# Patient Record
Sex: FEMALE | Race: WHITE | Employment: OTHER | URBAN - METROPOLITAN AREA
[De-identification: names, ages, dates, MRNs, and addresses within clinical notes are randomized per-mention and may not be internally consistent; named-entity substitution may affect disease eponyms.]

---

## 2019-06-17 ENCOUNTER — APPOINTMENT (OUTPATIENT)
Dept: GENERAL RADIOLOGY | Facility: CLINIC | Age: 59
End: 2019-06-17
Attending: PHYSICIAN ASSISTANT
Payer: COMMERCIAL

## 2019-06-17 ENCOUNTER — HOSPITAL ENCOUNTER (INPATIENT)
Facility: CLINIC | Age: 59
LOS: 5 days | Discharge: NURSING, REHAB OR LONG-TERM CARE | End: 2019-06-22
Attending: PHYSICIAN ASSISTANT | Admitting: INTERNAL MEDICINE
Payer: COMMERCIAL
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
## 2019-06-17 DIAGNOSIS — F41.9 ANXIETY: Primary | ICD-10-CM

## 2019-06-17 DIAGNOSIS — S72.001A CLOSED DISPLACED FRACTURE OF RIGHT FEMORAL NECK (H): ICD-10-CM

## 2019-06-17 LAB
ANION GAP SERPL CALCULATED.3IONS-SCNC: 4 MMOL/L (ref 3–14)
BASOPHILS # BLD AUTO: 0 10E9/L (ref 0–0.2)
BASOPHILS NFR BLD AUTO: 0.3 %
BUN SERPL-MCNC: 22 MG/DL (ref 7–30)
CALCIUM SERPL-MCNC: 8.8 MG/DL (ref 8.5–10.1)
CHLORIDE SERPL-SCNC: 109 MMOL/L (ref 94–109)
CO2 SERPL-SCNC: 29 MMOL/L (ref 20–32)
CREAT SERPL-MCNC: 0.97 MG/DL (ref 0.52–1.04)
DIFFERENTIAL METHOD BLD: ABNORMAL
EOSINOPHIL # BLD AUTO: 0.1 10E9/L (ref 0–0.7)
EOSINOPHIL NFR BLD AUTO: 0.5 %
ERYTHROCYTE [DISTWIDTH] IN BLOOD BY AUTOMATED COUNT: 12.3 % (ref 10–15)
GFR SERPL CREATININE-BSD FRML MDRD: 63 ML/MIN/{1.73_M2}
GLUCOSE SERPL-MCNC: 127 MG/DL (ref 70–99)
HCT VFR BLD AUTO: 40.4 % (ref 35–47)
HGB BLD-MCNC: 13.4 G/DL (ref 11.7–15.7)
IMM GRANULOCYTES # BLD: 0.1 10E9/L (ref 0–0.4)
IMM GRANULOCYTES NFR BLD: 0.6 %
INR PPP: 1.04 (ref 0.86–1.14)
LACTATE BLD-SCNC: 1.1 MMOL/L (ref 0.7–2)
LYMPHOCYTES # BLD AUTO: 1.1 10E9/L (ref 0.8–5.3)
LYMPHOCYTES NFR BLD AUTO: 8.3 %
MCH RBC QN AUTO: 29 PG (ref 26.5–33)
MCHC RBC AUTO-ENTMCNC: 33.2 G/DL (ref 31.5–36.5)
MCV RBC AUTO: 87 FL (ref 78–100)
MONOCYTES # BLD AUTO: 0.5 10E9/L (ref 0–1.3)
MONOCYTES NFR BLD AUTO: 3.5 %
NEUTROPHILS # BLD AUTO: 11.6 10E9/L (ref 1.6–8.3)
NEUTROPHILS NFR BLD AUTO: 86.8 %
NRBC # BLD AUTO: 0 10*3/UL
NRBC BLD AUTO-RTO: 0 /100
PLATELET # BLD AUTO: 240 10E9/L (ref 150–450)
POTASSIUM SERPL-SCNC: 4.6 MMOL/L (ref 3.4–5.3)
RBC # BLD AUTO: 4.62 10E12/L (ref 3.8–5.2)
SODIUM SERPL-SCNC: 142 MMOL/L (ref 133–144)
WBC # BLD AUTO: 13.3 10E9/L (ref 4–11)

## 2019-06-17 PROCEDURE — 85610 PROTHROMBIN TIME: CPT | Performed by: PHYSICIAN ASSISTANT

## 2019-06-17 PROCEDURE — 25000132 ZZH RX MED GY IP 250 OP 250 PS 637: Performed by: INTERNAL MEDICINE

## 2019-06-17 PROCEDURE — 99223 1ST HOSP IP/OBS HIGH 75: CPT | Mod: AI | Performed by: INTERNAL MEDICINE

## 2019-06-17 PROCEDURE — 99285 EMERGENCY DEPT VISIT HI MDM: CPT | Mod: 25

## 2019-06-17 PROCEDURE — 36415 COLL VENOUS BLD VENIPUNCTURE: CPT | Performed by: PHYSICIAN ASSISTANT

## 2019-06-17 PROCEDURE — 80048 BASIC METABOLIC PNL TOTAL CA: CPT | Performed by: PHYSICIAN ASSISTANT

## 2019-06-17 PROCEDURE — 85025 COMPLETE CBC W/AUTO DIFF WBC: CPT | Performed by: PHYSICIAN ASSISTANT

## 2019-06-17 PROCEDURE — 36415 COLL VENOUS BLD VENIPUNCTURE: CPT | Performed by: INTERNAL MEDICINE

## 2019-06-17 PROCEDURE — 93005 ELECTROCARDIOGRAM TRACING: CPT

## 2019-06-17 PROCEDURE — 25800030 ZZH RX IP 258 OP 636: Performed by: INTERNAL MEDICINE

## 2019-06-17 PROCEDURE — 73502 X-RAY EXAM HIP UNI 2-3 VIEWS: CPT

## 2019-06-17 PROCEDURE — 85610 PROTHROMBIN TIME: CPT | Performed by: INTERNAL MEDICINE

## 2019-06-17 PROCEDURE — 96374 THER/PROPH/DIAG INJ IV PUSH: CPT

## 2019-06-17 PROCEDURE — 96376 TX/PRO/DX INJ SAME DRUG ADON: CPT

## 2019-06-17 PROCEDURE — 12000000 ZZH R&B MED SURG/OB

## 2019-06-17 PROCEDURE — 25000128 H RX IP 250 OP 636: Performed by: INTERNAL MEDICINE

## 2019-06-17 PROCEDURE — 83605 ASSAY OF LACTIC ACID: CPT | Performed by: INTERNAL MEDICINE

## 2019-06-17 PROCEDURE — 25000128 H RX IP 250 OP 636: Performed by: PHYSICIAN ASSISTANT

## 2019-06-17 RX ORDER — ONDANSETRON 2 MG/ML
4 INJECTION INTRAMUSCULAR; INTRAVENOUS ONCE
Status: DISCONTINUED | OUTPATIENT
Start: 2019-06-17 | End: 2019-06-18

## 2019-06-17 RX ORDER — GUANFACINE 2 MG/1
2 TABLET ORAL AT BEDTIME
COMMUNITY
Start: 2019-05-31 | End: 2019-07-11

## 2019-06-17 RX ORDER — TRAZODONE HYDROCHLORIDE 50 MG/1
100 TABLET, FILM COATED ORAL AT BEDTIME
Status: DISCONTINUED | OUTPATIENT
Start: 2019-06-17 | End: 2019-06-22 | Stop reason: HOSPADM

## 2019-06-17 RX ORDER — HYDROMORPHONE HYDROCHLORIDE 1 MG/ML
.3-.5 INJECTION, SOLUTION INTRAMUSCULAR; INTRAVENOUS; SUBCUTANEOUS
Status: DISCONTINUED | OUTPATIENT
Start: 2019-06-17 | End: 2019-06-18

## 2019-06-17 RX ORDER — NAPROXEN SODIUM 550 MG/1
550 TABLET ORAL
COMMUNITY
Start: 2019-02-12

## 2019-06-17 RX ORDER — GABAPENTIN 300 MG/1
900 CAPSULE ORAL 3 TIMES DAILY
COMMUNITY
Start: 2019-02-12

## 2019-06-17 RX ORDER — BUPROPION HYDROCHLORIDE 300 MG/1
300 TABLET ORAL EVERY MORNING
COMMUNITY
Start: 2019-02-12 | End: 2019-06-26

## 2019-06-17 RX ORDER — NALOXONE HYDROCHLORIDE 0.4 MG/ML
.1-.4 INJECTION, SOLUTION INTRAMUSCULAR; INTRAVENOUS; SUBCUTANEOUS
Status: DISCONTINUED | OUTPATIENT
Start: 2019-06-17 | End: 2019-06-18

## 2019-06-17 RX ORDER — POLYETHYLENE GLYCOL 3350 17 G/17G
17 POWDER, FOR SOLUTION ORAL DAILY PRN
Status: DISCONTINUED | OUTPATIENT
Start: 2019-06-17 | End: 2019-06-22 | Stop reason: HOSPADM

## 2019-06-17 RX ORDER — ONDANSETRON 2 MG/ML
4 INJECTION INTRAMUSCULAR; INTRAVENOUS ONCE
Status: DISCONTINUED | OUTPATIENT
Start: 2019-06-17 | End: 2019-06-17

## 2019-06-17 RX ORDER — AMOXICILLIN 250 MG
1-2 CAPSULE ORAL 2 TIMES DAILY PRN
Status: DISCONTINUED | OUTPATIENT
Start: 2019-06-17 | End: 2019-06-22 | Stop reason: HOSPADM

## 2019-06-17 RX ORDER — LORAZEPAM 1 MG/1
1 TABLET ORAL DAILY PRN
Status: ON HOLD | COMMUNITY
Start: 2019-05-31 | End: 2019-06-22

## 2019-06-17 RX ORDER — LORAZEPAM 1 MG/1
1 TABLET ORAL DAILY PRN
Status: DISCONTINUED | OUTPATIENT
Start: 2019-06-17 | End: 2019-06-18

## 2019-06-17 RX ORDER — DULOXETIN HYDROCHLORIDE 60 MG/1
60 CAPSULE, DELAYED RELEASE ORAL DAILY
COMMUNITY
Start: 2019-05-31 | End: 2020-05-30

## 2019-06-17 RX ORDER — ACETAMINOPHEN 325 MG/1
650 TABLET ORAL EVERY 4 HOURS PRN
Status: DISCONTINUED | OUTPATIENT
Start: 2019-06-17 | End: 2019-06-18

## 2019-06-17 RX ORDER — GUANFACINE 1 MG/1
2 TABLET ORAL AT BEDTIME
Status: DISCONTINUED | OUTPATIENT
Start: 2019-06-17 | End: 2019-06-22 | Stop reason: HOSPADM

## 2019-06-17 RX ORDER — HYDROMORPHONE HYDROCHLORIDE 1 MG/ML
0.5 INJECTION, SOLUTION INTRAMUSCULAR; INTRAVENOUS; SUBCUTANEOUS
Status: COMPLETED | OUTPATIENT
Start: 2019-06-17 | End: 2019-06-17

## 2019-06-17 RX ORDER — HYDROMORPHONE HYDROCHLORIDE 1 MG/ML
0.5 INJECTION, SOLUTION INTRAMUSCULAR; INTRAVENOUS; SUBCUTANEOUS ONCE
Status: COMPLETED | OUTPATIENT
Start: 2019-06-17 | End: 2019-06-17

## 2019-06-17 RX ORDER — HYDROMORPHONE HYDROCHLORIDE 1 MG/ML
.3-.5 INJECTION, SOLUTION INTRAMUSCULAR; INTRAVENOUS; SUBCUTANEOUS
Status: DISCONTINUED | OUTPATIENT
Start: 2019-06-17 | End: 2019-06-17

## 2019-06-17 RX ORDER — DULOXETIN HYDROCHLORIDE 20 MG/1
20 CAPSULE, DELAYED RELEASE ORAL
COMMUNITY
Start: 2019-05-31 | End: 2019-06-17

## 2019-06-17 RX ORDER — BUPROPION HYDROCHLORIDE 150 MG/1
300 TABLET ORAL EVERY MORNING
COMMUNITY
Start: 2019-05-31 | End: 2020-05-30

## 2019-06-17 RX ORDER — OXYCODONE HYDROCHLORIDE 5 MG/1
5-10 TABLET ORAL EVERY 4 HOURS PRN
Status: DISCONTINUED | OUTPATIENT
Start: 2019-06-17 | End: 2019-06-18

## 2019-06-17 RX ORDER — SODIUM CHLORIDE 9 MG/ML
INJECTION, SOLUTION INTRAVENOUS CONTINUOUS
Status: DISCONTINUED | OUTPATIENT
Start: 2019-06-17 | End: 2019-06-18

## 2019-06-17 RX ORDER — TRAZODONE HYDROCHLORIDE 50 MG/1
100 TABLET, FILM COATED ORAL AT BEDTIME
COMMUNITY
Start: 2019-02-12

## 2019-06-17 RX ORDER — BUPROPION HYDROCHLORIDE 150 MG/1
450 TABLET ORAL DAILY
Status: DISCONTINUED | OUTPATIENT
Start: 2019-06-18 | End: 2019-06-22 | Stop reason: HOSPADM

## 2019-06-17 RX ORDER — GABAPENTIN 300 MG/1
300 CAPSULE ORAL 3 TIMES DAILY
Status: DISCONTINUED | OUTPATIENT
Start: 2019-06-17 | End: 2019-06-22 | Stop reason: HOSPADM

## 2019-06-17 RX ADMIN — HYDROMORPHONE HYDROCHLORIDE 0.5 MG: 1 INJECTION, SOLUTION INTRAMUSCULAR; INTRAVENOUS; SUBCUTANEOUS at 21:49

## 2019-06-17 RX ADMIN — GUANFACINE 2 MG: 1 TABLET ORAL at 21:15

## 2019-06-17 RX ADMIN — TRAZODONE HYDROCHLORIDE 100 MG: 50 TABLET ORAL at 21:16

## 2019-06-17 RX ADMIN — SODIUM CHLORIDE: 9 INJECTION, SOLUTION INTRAVENOUS at 22:06

## 2019-06-17 RX ADMIN — HYDROMORPHONE HYDROCHLORIDE 0.5 MG: 1 INJECTION, SOLUTION INTRAMUSCULAR; INTRAVENOUS; SUBCUTANEOUS at 16:15

## 2019-06-17 RX ADMIN — OXYCODONE HYDROCHLORIDE 10 MG: 5 TABLET ORAL at 19:25

## 2019-06-17 RX ADMIN — HYDROMORPHONE HYDROCHLORIDE 0.5 MG: 1 INJECTION, SOLUTION INTRAMUSCULAR; INTRAVENOUS; SUBCUTANEOUS at 18:10

## 2019-06-17 RX ADMIN — HYDROMORPHONE HYDROCHLORIDE 0.5 MG: 1 INJECTION, SOLUTION INTRAMUSCULAR; INTRAVENOUS; SUBCUTANEOUS at 15:30

## 2019-06-17 RX ADMIN — HYDROMORPHONE HYDROCHLORIDE 0.5 MG: 1 INJECTION, SOLUTION INTRAMUSCULAR; INTRAVENOUS; SUBCUTANEOUS at 20:09

## 2019-06-17 RX ADMIN — GABAPENTIN 300 MG: 300 CAPSULE ORAL at 19:26

## 2019-06-17 SDOH — HEALTH STABILITY: MENTAL HEALTH: HOW OFTEN DO YOU HAVE A DRINK CONTAINING ALCOHOL?: NEVER

## 2019-06-17 ASSESSMENT — ACTIVITIES OF DAILY LIVING (ADL)
ADLS_ACUITY_SCORE: 18
BATHING: 0-->INDEPENDENT
DRESS: 0-->INDEPENDENT
RETIRED_EATING: 0-->INDEPENDENT
AMBULATION: 0-->INDEPENDENT
SWALLOWING: 0-->SWALLOWS FOODS/LIQUIDS WITHOUT DIFFICULTY
TOILETING: 0-->INDEPENDENT
RETIRED_COMMUNICATION: 0-->UNDERSTANDS/COMMUNICATES WITHOUT DIFFICULTY
WHICH_OF_THE_ABOVE_FUNCTIONAL_RISKS_HAD_A_RECENT_ONSET_OR_CHANGE?: AMBULATION
FALL_HISTORY_WITHIN_LAST_SIX_MONTHS: YES
COGNITION: 0 - NO COGNITION ISSUES REPORTED
NUMBER_OF_TIMES_PATIENT_HAS_FALLEN_WITHIN_LAST_SIX_MONTHS: 1
TRANSFERRING: 0-->INDEPENDENT

## 2019-06-17 ASSESSMENT — MIFFLIN-ST. JEOR: SCORE: 1337.46

## 2019-06-17 ASSESSMENT — ENCOUNTER SYMPTOMS
NUMBNESS: 0
ARTHRALGIAS: 1

## 2019-06-17 NOTE — H&P
[facility]  Hospitalist Admission Note  Name: Greyson Rogers    MRN: [id number]  YOB: 1960    Age: 59 year old  Date of admission: 6/17/2019  Primary care provider: Shakira Edwards    Chief Complaint: Hip pain    Assessment and Plan:   Mechanical fall, right femoral neck fracture: Patient tripped while walking her dog and landed on her right hip.  Unable to ambulate secondary to pain.  No other areas of pain.  Pelvis x-ray showing a impacted right femoral neck fracture.  Anticipate operative repair needed.  RCRI score of 0.  No recent chest pain or shortness of breath with exertion.  Normal-appearing EKG.  Medically optimized for repair of hip fracture.  -Orthopedics consult  -N.p.o. at midnight  -Acetaminophen, oral oxycodone, IV Dilaudid as needed for pain control  -PT, OT consult  -PCD's for now    MDD, anxiety: Continue PTA Wellbutrin  mg daily, Cymbalta 80 mg daily, guanfacine 2 mg daily, trazodone 100 mg at bedtime, and lorazepam 1 mg daily as needed.    HLD, hypertriglyceridemia: Not currently on medications for this.    Chronic low back pain: Degenerative disease on previous x-rays.  Continue PTA gabapentin 300 mg 3 times daily.    Soft blood pressure: Pressure 102/52.  Suspect a chronic issue.  Vital signs at last care everywhere visit showed a blood pressure of 90/56.    DVT Prophylaxis: Pneumatic Compression Devices  Code Status: Full Code  FEN: Regular diet until n.p.o. midnight, start IV fluids when n.p.o.  Discharge Dispo: TBD.  PT, OT, social work consult  Estimated Disch Date / # of Days until Disch: Admit to inpatient for acute hip fracture.  Anticipate minimal 2 night hospitalization.      History of Present Illness:  Greyson Rogers is a 59 year old female with PMH including MDD, anxiety, HLD, and chronic low back pain who presents with right hip pain following a fall.  The patient was walking her dog when the dog pulled on the leash hard and she turned and fell  onto her right hip.  Instantly had severe pain on the right hip.  She attempted to stand but could not secondary to pain.  EMS was called.  She denies any lightheadedness or dizziness prior to falling.  No history of syncope.  No other areas of pain currently.  Denies any recent chest pain or shortness of breath with exertion.  She is a non-smoker.  No previous surgical history.  Prior to this was in normal state health without any recent fevers, chills, cough, abdominal pain, nausea, vomiting, dysuria, diarrhea.    History obtained from patient, medical record, and from TAMAR Green in the emergency department.  Blood pressure 102/52.  Pulse 67.  Oxygen 93% room air.  Afebrile.  X-ray of the pelvis showing an impacted right femoral neck fracture.  Received 0.5 mg IV Dilaudid.  Anticipate will need operative repair.  Basic labs and EKG being obtained.  Admit inpatient.     Past Medical History reviewed:  MDD  Anxiety  HLD  Chronic back pain    Past Surgical History reviewed:  Breast biopsy    Social History reviewed:  Social History     Tobacco Use     Smoking status: Never Smoker   Substance Use Topics     Alcohol use: Never     Frequency: Never     Social History     Social History Narrative     Not on file     Family History reviewed:  Mother with history of breast cancer and osteoporosis  Sister with history of mitral valve prolapse  Denies any family history of problems with anesthetic or DVT/PE    Allergies:  No Known Allergies  Medications:  Prior to Admission medications    Medication Sig Last Dose Taking? Auth Provider   buPROPion (WELLBUTRIN XL) 150 MG 24 hr tablet Take 150 mg by mouth daily  Yes Reported, Patient   buPROPion (WELLBUTRIN XL) 300 MG 24 hr tablet Take 300 mg by mouth daily  Yes Reported, Patient   DULoxetine (CYMBALTA) 20 MG capsule Take 20 mg by mouth daily  Yes Reported, Patient   DULoxetine (CYMBALTA) 60 MG capsule Take 60 mg by mouth daily  Yes Reported, Patient   gabapentin (NEURONTIN)  "300 MG capsule Take 300 mg by mouth tid  Yes Reported, Patient   guanFACINE (TENEX) 2 MG tablet Take 2 mg by mouth daily  Yes Reported, Patient   LORazepam (ATIVAN) 1 MG tablet Take 1 mg by mouth daily prn  Yes Reported, Patient   naproxen sodium (ANAPROX) 550 MG tablet Take 550 mg by mouth  Yes Reported, Patient   traZODone (DESYREL) 50 MG tablet Take 100 mg by mouth hs  Yes Reported, Patient     Review of Systems:  A Comprehensive greater than 10 system review of systems was carried out.  Pertinent positives and negatives are noted above.  Otherwise negative.     Physical Exam:  Blood pressure 102/52, pulse 67, temperature 98.6  F (37  C), temperature source Oral, resp. rate 22, height 1.753 m (5' 9\"), weight 70.3 kg (155 lb), SpO2 94 %.  Wt Readings from Last 1 Encounters:   06/17/19 70.3 kg (155 lb)     Exam:  Constitutional: Awake, NAD  Eyes: sclera white, small pupils bilaterally  HEENT: atraumatic, MMM  Respiratory: no respiratory distress, lungs cta bilaterally, no crackles or wheeze  Cardiovascular: RRR.  No murmur   GI: non-tender, not distended, bowel sounds present  Skin: no rash or lesions, acyanotic  Musculoskeletal/extremities: No lower extremity edema  Neurologic: A&O, speech clear, light touch sensation intact, wiggles toes laterally  Psychiatric: calm, cooperative, normal affect    Lab and imaging data personally reviewed:  Labs:  Recent Labs   Lab 06/17/19  1648   WBC 13.3*   HGB 13.4   HCT 40.4   MCV 87        Recent Labs   Lab 06/17/19  1648      POTASSIUM 4.6   CHLORIDE 109   CO2 29   ANIONGAP 4   *   BUN 22   CR 0.97   GFRESTIMATED 63   GFRESTBLACK 73   ESTHER 8.8       EKG: NSR without ST elevation or depression, no Q waves    Imaging:  Recent Results (from the past 24 hour(s))   XR Pelvis w Hip Right 1 View    Narrative    PELVIS AND HIP RIGHT ONE VIEW   6/17/2019 3:59 PM     HISTORY: Right hip pain after fall.    COMPARISON: None.      Impression    IMPRESSION: Acute right " femoral neck fracture that appears impacted.  Posterior angulation of the distal fracture fragment. Right femoral  head appears to be located. Left hip is unremarkable.    MD Julio Cesar JONES MD  Hospitalist  [facility]

## 2019-06-17 NOTE — ED PROVIDER NOTES
Emergency Department Attending Supervision Note  6/17/2019  4:17 PM      I evaluated this patient in conjunction with Eneida Shannon PA-C.      Briefly, the patient presented with right hip pain after mechanical fall onto the site. Exam is consistent with likely fracture, which is confirmed on XR (below). Plan ortho consult and admission to the hospitalist service for risk stratification.    Results for orders placed or performed during the hospital encounter of 06/17/19 (from the past 24 hour(s))   XR Pelvis w Hip Right 1 View    Narrative    PELVIS AND HIP RIGHT ONE VIEW   6/17/2019 3:59 PM     HISTORY: Right hip pain after fall.    COMPARISON: None.      Impression    IMPRESSION: Acute right femoral neck fracture that appears impacted.  Posterior angulation of the distal fracture fragment. Right femoral  head appears to be located. Left hip is unremarkable.    JAROCHO COLLIER MD   EKG 12 lead   Result Value Ref Range    Interpretation ECG Click View Image link to view waveform and result    Basic metabolic panel (BMP)   Result Value Ref Range    Sodium 142 133 - 144 mmol/L    Potassium 4.6 3.4 - 5.3 mmol/L    Chloride 109 94 - 109 mmol/L    Carbon Dioxide 29 20 - 32 mmol/L    Anion Gap 4 3 - 14 mmol/L    Glucose 127 (H) 70 - 99 mg/dL    Urea Nitrogen 22 7 - 30 mg/dL    Creatinine 0.97 0.52 - 1.04 mg/dL    GFR Estimate 63 >60 mL/min/[1.73_m2]    GFR Estimate If Black 73 >60 mL/min/[1.73_m2]    Calcium 8.8 8.5 - 10.1 mg/dL   CBC + differential   Result Value Ref Range    WBC 13.3 (H) 4.0 - 11.0 10e9/L    RBC Count 4.62 3.8 - 5.2 10e12/L    Hemoglobin 13.4 11.7 - 15.7 g/dL    Hematocrit 40.4 35.0 - 47.0 %    MCV 87 78 - 100 fl    MCH 29.0 26.5 - 33.0 pg    MCHC 33.2 31.5 - 36.5 g/dL    RDW 12.3 10.0 - 15.0 %    Platelet Count 240 150 - 450 10e9/L    Diff Method Automated Method     % Neutrophils 86.8 %    % Lymphocytes 8.3 %    % Monocytes 3.5 %    % Eosinophils 0.5 %    % Basophils 0.3 %    % Immature Granulocytes  0.6 %    Nucleated RBCs 0 0 /100    Absolute Neutrophil 11.6 (H) 1.6 - 8.3 10e9/L    Absolute Lymphocytes 1.1 0.8 - 5.3 10e9/L    Absolute Monocytes 0.5 0.0 - 1.3 10e9/L    Absolute Eosinophils 0.1 0.0 - 0.7 10e9/L    Absolute Basophils 0.0 0.0 - 0.2 10e9/L    Abs Immature Granulocytes 0.1 0 - 0.4 10e9/L    Absolute Nucleated RBC 0.0        My impression is:    ICD-10-CM   1. Closed displaced fracture of right femoral neck (H) S72.001A           2. Fall, initial encounter W19.XXXA              MD Kvng Parada John Eric, MD  06/17/19 1754

## 2019-06-17 NOTE — PHARMACY-ADMISSION MEDICATION HISTORY
Admission medication history interview status for this patient is complete. See [facility] admission navigator for allergy information, prior to admission medications and immunization status.     Medication history interview source(s):Patient  Medication history resources (including written lists, pill bottles, clinic record):EPIC    Changes made to PTA medication list:  Added: frequencies to some meds  Deleted: Duloxetine 20mg, per pt is using only 60mg tabs  Changed: as above    Medication reconciliation/reorder completed by provider prior to medication history? No    Do you take OTC medications (eg tylenol, ibuprofen, fish oil, eye/ear drops, etc)? Y(Y/N)    For patients on insulin therapy: N (Y/N)  Lantus/levemir/NPH/Mix 70/30 dose:   (Y/N) (see Med list for doses)   Sliding scale Novolog Y/N  If Yes, do you have a baseline novolog pre-meal dose:  units with meals  Patients eat three meals a day:   Y/N    How many episodes of hypoglycemia do you have per week: _______  How many missed doses do you have per week: ______  How many times do you check your blood glucose per day: _______  Do you have a Continuous glucose monitor (CGM)   Y/N (remind pt that not approved for hospital use)   Any Barriers to therapy - Be specific :  cost of medications, comfortable with giving injections (if applicable), comfortable and confident with current diabetes regimen: Y/N ______________      Prior to Admission medications    Medication Sig Last Dose Taking? Auth Provider   buPROPion (WELLBUTRIN XL) 150 MG 24 hr tablet Take 150 mg by mouth every morning  6/17/2019 at Unknown time Yes Reported, Patient   buPROPion (WELLBUTRIN XL) 300 MG 24 hr tablet Take 300 mg by mouth every morning  6/17/2019 at Unknown time Yes Reported, Patient   DULoxetine (CYMBALTA) 60 MG capsule Take 60 mg by mouth daily  6/17/2019 at Unknown time Yes Reported, Patient   gabapentin (NEURONTIN) 300 MG capsule Take 900 mg by mouth 3 times daily  6/17/2019 at am Yes  Reported, Patient   guanFACINE (TENEX) 2 MG tablet Take 2 mg by mouth At Bedtime  6/16/2019 at Unknown time Yes Reported, Patient   LORazepam (ATIVAN) 1 MG tablet Take 1 mg by mouth daily as needed for anxiety   Yes Reported, Patient   naproxen sodium (ANAPROX) 550 MG tablet Take 550 mg by mouth daily (with breakfast)  6/17/2019 at Unknown time Yes Reported, Patient   traZODone (DESYREL) 50 MG tablet Take 100 mg by mouth At Bedtime  6/16/2019 at Unknown time Yes Reported, Patient

## 2019-06-17 NOTE — ED NOTES
Bed: ED30  Expected date: 6/17/19  Expected time: 2:56 PM  Means of arrival: Ambulance  Comments:  66 yo F

## 2019-06-17 NOTE — ED NOTES
"[facility]  ED Nurse Handoff Report    Greyson Rogers is a 59 year old female   ED Chief complaint: Hip Pain  . ED Diagnosis:   Final diagnoses:   Closed displaced fracture of right femoral neck (H)   Fall, initial encounter     Allergies: No Known Allergies    Code Status: Full Code  Activity level - Baseline/Home:  independent. Activity Level - Current:   Total Care. Lift room needed: Yes. Bariatric: No   Needed: No   Isolation: No. Infection: Not Applicable.     Vital Signs:   Vitals:    06/17/19 1509 06/17/19 1530 06/17/19 1615   BP: (!) 125/111 102/52    Pulse: 81 67    Resp: 22     Temp: 98.6  F (37  C)     TempSrc: Oral     SpO2: 98% 93% 94%   Weight: 70.3 kg (155 lb)     Height: 1.753 m (5' 9\")         Cardiac Rhythm:  ,      Pain level: 0-10 Pain Scale: 10  Patient confused: No. Patient Falls Risk: Yes.   Elimination Status: Pt has not voided in ED  Patient Report - Initial Complaint: hip pain. Focused Assessment: Pt states having right sided hip pain after getting pulled by her dog and landing on right hip. Pt was unable to get off the ground after incident. CMS intact. Pt initially was unable to extend leg and was laying in a position of comfort with leg bent. After return from xray pt was able to extend leg. Pt in on 2 liters NC after desatting to 88% after pain meds.  Tests Performed: xray. Abnormal Results: Abnormal Labs Reviewed - No abnormal labs to display.   Treatments provided: dilaudid 0.5 x 2  Family Comments: Daughter at bedside and aware of plan of care  OBS brochure/video discussed/provided to patient:  N/A  ED Medications:   Medications   HYDROmorphone (PF) (DILAUDID) injection 0.5 mg (0.5 mg Intravenous Given 6/17/19 1615)     Drips infusing:  No  For the majority of the shift, the patient's behavior Green. Interventions performed were n/a.     Severe Sepsis OR Septic Shock Diagnosis Present: No      ED Nurse Name/Phone Number: Vanessa Jaime,   4:24 PM  RECEIVING " UNIT ED HANDOFF REVIEW    Above ED Nurse Handoff Report was reviewed: Yes  Reviewed by: Leanna Canchola on June 17, 2019 at 5:57 PM

## 2019-06-17 NOTE — ED PROVIDER NOTES
"  History     Chief Complaint:  Right Hip Pain    HPI   Greyson Rogers is a 59 year old female with a history of depression who presents via EMS with right hip pain. The patient reports that she got pulled by her dog and stumbled and fell on her right hip. She reports being unable to stand or straighten her leg after the accident. Police were in the neighborhood and told her to go inside after the incident. Upon EMS arrival they administered 100 mcg of Fentanyl with some relief. Upon arrival to the ED, the patient reports her pain is again a 10/10 in severity. She denies any numbness or tingling in her leg.     Allergies:  NKDA     Medications:    Tylenol   Wellbutrin   Neurontin  Anaprox  Desyrel   Cymbalta  Tenex  Ativan    Past Medical History:    Depression  Chronic insomnia  Chronic back pain  Hyperlipidemia  IFG  MARTA    Past Surgical History:    Left Breast Biopsy    Family History:    Depression  Breast cancer  Osteoporosis  Heart Disease    Social History:  Negative for tobacco use.  Negative for alcohol use.  Marital Status:   [2]       Review of Systems   Musculoskeletal: Positive for arthralgias (Right hip).   Neurological: Negative for numbness.   All other systems reviewed and are negative.      Physical Exam     Patient Vitals for the past 24 hrs:   BP Temp Temp src Pulse Heart Rate Resp SpO2 Height Weight   06/17/19 1615 -- -- -- -- -- -- 94 % -- --   06/17/19 1530 102/52 -- -- 67 -- -- 93 % -- --   06/17/19 1509 (!) 125/111 98.6  F (37  C) Oral 81 81 22 98 % 1.753 m (5' 9\") 70.3 kg (155 lb)       Physical Exam   General: Alert, interactive. Significant pain with movement of the right hip.   Head:  Scalp is atraumatic.  Eyes:  EOM intact. The pupils are equal, round, and reactive to light. No scleral icterus.  ENT:                                      Ears:  The external ears are normal.  Nose:  The external nose is normal.  Throat:  The oropharynx is normal. Mucus membranes are dry.             "     Neck:  Normal range of motion. There is no rigidity.   CV:  Regular rate and rhythm. No murmur. 2+ radial pulses  Resp:  Breath sounds are clear bilaterally. Non-labored, no retractions or accessory muscle use.  GI:  Abdomen is soft, no distension, no tenderness.   MS:  Diffuse tenderness to the right hip. Hip flexed and patient elicits significant pain with extension. Remainder of right lower extremity non tender.   Skin:  Warm and dry. No open wound.  Neuro:  Strength and sensation grossly intact. 5/5 strength with DF and PF. Sensation intact to RLE.   Psych:  Awake. Alert.  Appropriate interactions.     Emergency Department Course   Imaging:  Radiographic findings were communicated with the patient who voiced understanding of the findings.    XR Hip Right 2-3 views:   Acute right femoral neck fracture that appears impacted.  Posterior angulation of the distal fracture fragment. Right femoral  head appears to be located. Left hip is unremarkable. As per radiology.     Laboratory:   CBC: WBC: 13.3 (H), HGB: 13.4, PLT: 240  BMP: Glucose 127 (H), o/w WNL (Creatinine: 0.97)    Interventions:  1530 Dilaudid 0.5 mg IV  1615 Dilaudid 0.5 mg IV    Emergency Department Course:  Nursing notes and vitals reviewed. (1514) I performed an exam of the patient as documented above.      IV inserted. Medicine administered as documented above. Blood drawn. This was sent to the lab for further testing, results above.     The patient was sent for a XR Right Hip while in the emergency department, findings above.      (1610) I rechecked the patient and discussed the results of her workup thus far.      (1640) I consulted with Dr. Bell of the hospitalist services. They are in agreement to accept the patient for admission    (1641) I consulted with Namrata Hurley PA-C, Orthopedics, regarding the patient's history and presentation here in the emergency department.    (1653) I rechecked the patient and discussed plans for  admission.    Findings and plan explained to the Patient who consents to admission. Discussed the patient with Dr. Bell, who will admit the patient to a bed for further monitoring, evaluation, and treatment.     I personally reviewed the imaging results with the Patient and answered all related questions prior to admission    Impression & Plan    Medical Decision Making:  Greyson Rogers is a 59 year old female with medical history including depression and chronic back pain who presents for evaluation of right hip pain pain after getting pulled by her dog and falling. CMS is intact distally in the extremity.  Xrays reveal a right femoral neck fracture that appears impacted;.  The patients head to toe trauma exam is otherwise normal at this time and no further trauma workup is needed as I believe there is no signs of serious head, neck, chest, spinal, extremity or abdominal injuries.  I discussed patient with orthopedics who plan to consult tomorrow morning and likely surgery.  Discussed patient with Dr. Bell of the hospitalist service who graciously accepted care of the patient.  Patient's pain was controlled with interventions in the emergency department and she agrees with plan for admission.     Diagnosis:    ICD-10-CM    1. Closed displaced fracture of right femoral neck (H) S72.001A Basic metabolic panel (BMP)     CBC + differential   2. Fall, initial encounter W19.XXXA      Disposition:  Admitted to hospital    Scribe Disclosure:  I, Charity Sweet, am serving as a scribe on 6/17/2019 at 3:24 PM to personally document services performed by Eneida Shannon PA-C based on my observations and the provider's statements to me.     Charity Sweet  6/17/2019   [facility] EMERGENCY DEPARTMENT       Eneida Shannon PA-C  06/17/19 1746

## 2019-06-17 NOTE — ED TRIAGE NOTES
Pt arrives to the ED via EMS from home where she got pulled by her dog and landed on her right hip. Pt unable to stand after incident. Pt given 100 mcg IV of fentanyl by EMS with some relief. CMS intact. Upon arrival pt unable to extend leg due to pain.

## 2019-06-18 ENCOUNTER — ANESTHESIA (OUTPATIENT)
Dept: SURGERY | Facility: CLINIC | Age: 59
End: 2019-06-18
Payer: COMMERCIAL

## 2019-06-18 ENCOUNTER — APPOINTMENT (OUTPATIENT)
Dept: GENERAL RADIOLOGY | Facility: CLINIC | Age: 59
End: 2019-06-18
Attending: ORTHOPAEDIC SURGERY
Payer: COMMERCIAL

## 2019-06-18 ENCOUNTER — ANESTHESIA EVENT (OUTPATIENT)
Dept: SURGERY | Facility: CLINIC | Age: 59
End: 2019-06-18
Payer: COMMERCIAL

## 2019-06-18 PROBLEM — S72.009A FEMORAL NECK FRACTURE (H): Status: ACTIVE | Noted: 2019-06-18

## 2019-06-18 LAB
ABO + RH BLD: NORMAL
ABO + RH BLD: NORMAL
BLD GP AB SCN SERPL QL: NORMAL
BLOOD BANK CMNT PATIENT-IMP: NORMAL
INR PPP: 1.08 (ref 0.86–1.14)
INTERPRETATION ECG - MUSE: NORMAL
SPECIMEN EXP DATE BLD: NORMAL

## 2019-06-18 PROCEDURE — 25000128 H RX IP 250 OP 636: Performed by: ANESTHESIOLOGY

## 2019-06-18 PROCEDURE — 37000009 ZZH ANESTHESIA TECHNICAL FEE, EACH ADDTL 15 MIN: Performed by: ORTHOPAEDIC SURGERY

## 2019-06-18 PROCEDURE — 25000128 H RX IP 250 OP 636: Performed by: NURSE ANESTHETIST, CERTIFIED REGISTERED

## 2019-06-18 PROCEDURE — 37000008 ZZH ANESTHESIA TECHNICAL FEE, 1ST 30 MIN: Performed by: ORTHOPAEDIC SURGERY

## 2019-06-18 PROCEDURE — 71000012 ZZH RECOVERY PHASE 1 LEVEL 1 FIRST HR: Performed by: ORTHOPAEDIC SURGERY

## 2019-06-18 PROCEDURE — 0SR903A REPLACEMENT OF RIGHT HIP JOINT WITH CERAMIC SYNTHETIC SUBSTITUTE, UNCEMENTED, OPEN APPROACH: ICD-10-PCS | Performed by: ORTHOPAEDIC SURGERY

## 2019-06-18 PROCEDURE — 25000128 H RX IP 250 OP 636: Performed by: ORTHOPAEDIC SURGERY

## 2019-06-18 PROCEDURE — 27210794 ZZH OR GENERAL SUPPLY STERILE: Performed by: ORTHOPAEDIC SURGERY

## 2019-06-18 PROCEDURE — 99232 SBSQ HOSP IP/OBS MODERATE 35: CPT | Performed by: INTERNAL MEDICINE

## 2019-06-18 PROCEDURE — 86900 BLOOD TYPING SEROLOGIC ABO: CPT | Performed by: PHYSICIAN ASSISTANT

## 2019-06-18 PROCEDURE — 12000000 ZZH R&B MED SURG/OB

## 2019-06-18 PROCEDURE — 86850 RBC ANTIBODY SCREEN: CPT | Performed by: PHYSICIAN ASSISTANT

## 2019-06-18 PROCEDURE — 25800030 ZZH RX IP 258 OP 636: Performed by: ANESTHESIOLOGY

## 2019-06-18 PROCEDURE — 25000128 H RX IP 250 OP 636: Performed by: INTERNAL MEDICINE

## 2019-06-18 PROCEDURE — 25800030 ZZH RX IP 258 OP 636: Performed by: NURSE ANESTHETIST, CERTIFIED REGISTERED

## 2019-06-18 PROCEDURE — 88311 DECALCIFY TISSUE: CPT | Performed by: ORTHOPAEDIC SURGERY

## 2019-06-18 PROCEDURE — 71000013 ZZH RECOVERY PHASE 1 LEVEL 1 EA ADDTL HR: Performed by: ORTHOPAEDIC SURGERY

## 2019-06-18 PROCEDURE — C1776 JOINT DEVICE (IMPLANTABLE): HCPCS | Performed by: ORTHOPAEDIC SURGERY

## 2019-06-18 PROCEDURE — 25000128 H RX IP 250 OP 636: Performed by: PHYSICIAN ASSISTANT

## 2019-06-18 PROCEDURE — 25000125 ZZHC RX 250: Performed by: PHYSICIAN ASSISTANT

## 2019-06-18 PROCEDURE — 88311 DECALCIFY TISSUE: CPT | Mod: 26 | Performed by: ORTHOPAEDIC SURGERY

## 2019-06-18 PROCEDURE — 36000063 ZZH SURGERY LEVEL 4 EA 15 ADDTL MIN: Performed by: ORTHOPAEDIC SURGERY

## 2019-06-18 PROCEDURE — 86901 BLOOD TYPING SEROLOGIC RH(D): CPT | Performed by: PHYSICIAN ASSISTANT

## 2019-06-18 PROCEDURE — 40000985 XR HIP PORT RT 1 VW: Mod: TC

## 2019-06-18 PROCEDURE — 40000986 XR PELVIS AD HIP PORTABLE RIGHT 1 VIEW

## 2019-06-18 PROCEDURE — 88305 TISSUE EXAM BY PATHOLOGIST: CPT | Mod: 26 | Performed by: ORTHOPAEDIC SURGERY

## 2019-06-18 PROCEDURE — 25800025 ZZH RX 258: Performed by: ORTHOPAEDIC SURGERY

## 2019-06-18 PROCEDURE — 25000125 ZZHC RX 250: Performed by: ORTHOPAEDIC SURGERY

## 2019-06-18 PROCEDURE — 85610 PROTHROMBIN TIME: CPT | Performed by: PHYSICIAN ASSISTANT

## 2019-06-18 PROCEDURE — 25800030 ZZH RX IP 258 OP 636: Performed by: ORTHOPAEDIC SURGERY

## 2019-06-18 PROCEDURE — 25000132 ZZH RX MED GY IP 250 OP 250 PS 637: Performed by: ORTHOPAEDIC SURGERY

## 2019-06-18 PROCEDURE — 40000306 ZZH STATISTIC PRE PROC ASSESS II: Performed by: ORTHOPAEDIC SURGERY

## 2019-06-18 PROCEDURE — 88305 TISSUE EXAM BY PATHOLOGIST: CPT | Performed by: ORTHOPAEDIC SURGERY

## 2019-06-18 PROCEDURE — 25000125 ZZHC RX 250: Performed by: NURSE ANESTHETIST, CERTIFIED REGISTERED

## 2019-06-18 PROCEDURE — 25000132 ZZH RX MED GY IP 250 OP 250 PS 637: Performed by: INTERNAL MEDICINE

## 2019-06-18 PROCEDURE — 36415 COLL VENOUS BLD VENIPUNCTURE: CPT | Performed by: PHYSICIAN ASSISTANT

## 2019-06-18 PROCEDURE — 36000093 ZZH SURGERY LEVEL 4 1ST 30 MIN: Performed by: ORTHOPAEDIC SURGERY

## 2019-06-18 DEVICE — IMP SHELL ACETABULUM HOWM 54MM 542-11-54F: Type: IMPLANTABLE DEVICE | Site: HIP | Status: FUNCTIONAL

## 2019-06-18 DEVICE — IMP STEM FEMORAL HIP STRK ACCOLADE II 132DEG SZ 5 6720-0535: Type: IMPLANTABLE DEVICE | Site: HIP | Status: FUNCTIONAL

## 2019-06-18 DEVICE — IMP HEAD FEMORAL STRK BIOLOX DELTA CERAMIC 36MM +2.5MM: Type: IMPLANTABLE DEVICE | Site: HIP | Status: FUNCTIONAL

## 2019-06-18 DEVICE — IMP LINER STRK TRIDENT X3 POLY 36MM 0DEG SZ F 623-00-36F: Type: IMPLANTABLE DEVICE | Site: HIP | Status: FUNCTIONAL

## 2019-06-18 DEVICE — IMP SCR BONE STRK TORX 6.5X25MM CAN 2030-6525-1: Type: IMPLANTABLE DEVICE | Site: HIP | Status: FUNCTIONAL

## 2019-06-18 RX ORDER — AMOXICILLIN 250 MG
2 CAPSULE ORAL 2 TIMES DAILY
Status: DISCONTINUED | OUTPATIENT
Start: 2019-06-18 | End: 2019-06-22 | Stop reason: HOSPADM

## 2019-06-18 RX ORDER — HYDROMORPHONE HYDROCHLORIDE 1 MG/ML
.3-.5 INJECTION, SOLUTION INTRAMUSCULAR; INTRAVENOUS; SUBCUTANEOUS
Status: DISCONTINUED | OUTPATIENT
Start: 2019-06-18 | End: 2019-06-18

## 2019-06-18 RX ORDER — NEOSTIGMINE METHYLSULFATE 1 MG/ML
VIAL (ML) INJECTION PRN
Status: DISCONTINUED | OUTPATIENT
Start: 2019-06-18 | End: 2019-06-18

## 2019-06-18 RX ORDER — FENTANYL CITRATE 50 UG/ML
INJECTION, SOLUTION INTRAMUSCULAR; INTRAVENOUS PRN
Status: DISCONTINUED | OUTPATIENT
Start: 2019-06-18 | End: 2019-06-18

## 2019-06-18 RX ORDER — ONDANSETRON 2 MG/ML
4 INJECTION INTRAMUSCULAR; INTRAVENOUS EVERY 30 MIN PRN
Status: DISCONTINUED | OUTPATIENT
Start: 2019-06-18 | End: 2019-06-18 | Stop reason: HOSPADM

## 2019-06-18 RX ORDER — ACETAMINOPHEN 325 MG/1
975 TABLET ORAL EVERY 8 HOURS
Status: COMPLETED | OUTPATIENT
Start: 2019-06-18 | End: 2019-06-21

## 2019-06-18 RX ORDER — NALOXONE HYDROCHLORIDE 0.4 MG/ML
.1-.4 INJECTION, SOLUTION INTRAMUSCULAR; INTRAVENOUS; SUBCUTANEOUS
Status: DISCONTINUED | OUTPATIENT
Start: 2019-06-18 | End: 2019-06-22 | Stop reason: HOSPADM

## 2019-06-18 RX ORDER — NALOXONE HYDROCHLORIDE 0.4 MG/ML
.1-.4 INJECTION, SOLUTION INTRAMUSCULAR; INTRAVENOUS; SUBCUTANEOUS
Status: DISCONTINUED | OUTPATIENT
Start: 2019-06-18 | End: 2019-06-18

## 2019-06-18 RX ORDER — LIDOCAINE 40 MG/G
CREAM TOPICAL
Status: DISCONTINUED | OUTPATIENT
Start: 2019-06-18 | End: 2019-06-18 | Stop reason: HOSPADM

## 2019-06-18 RX ORDER — SALIVA STIMULANT COMB. NO.3
1 SPRAY, NON-AEROSOL (ML) MUCOUS MEMBRANE EVERY 6 HOURS PRN
Status: DISCONTINUED | OUTPATIENT
Start: 2019-06-18 | End: 2019-06-22 | Stop reason: HOSPADM

## 2019-06-18 RX ORDER — CEFAZOLIN SODIUM 2 G/100ML
2 INJECTION, SOLUTION INTRAVENOUS
Status: COMPLETED | OUTPATIENT
Start: 2019-06-18 | End: 2019-06-18

## 2019-06-18 RX ORDER — PROPOFOL 10 MG/ML
INJECTION, EMULSION INTRAVENOUS PRN
Status: DISCONTINUED | OUTPATIENT
Start: 2019-06-18 | End: 2019-06-18

## 2019-06-18 RX ORDER — OXYCODONE HYDROCHLORIDE 5 MG/1
5-10 TABLET ORAL
Status: DISCONTINUED | OUTPATIENT
Start: 2019-06-18 | End: 2019-06-19

## 2019-06-18 RX ORDER — SODIUM CHLORIDE, SODIUM LACTATE, POTASSIUM CHLORIDE, CALCIUM CHLORIDE 600; 310; 30; 20 MG/100ML; MG/100ML; MG/100ML; MG/100ML
INJECTION, SOLUTION INTRAVENOUS CONTINUOUS
Status: DISCONTINUED | OUTPATIENT
Start: 2019-06-18 | End: 2019-06-18 | Stop reason: HOSPADM

## 2019-06-18 RX ORDER — CEFAZOLIN SODIUM 1 G/50ML
1 INJECTION, SOLUTION INTRAVENOUS SEE ADMIN INSTRUCTIONS
Status: DISCONTINUED | OUTPATIENT
Start: 2019-06-18 | End: 2019-06-18 | Stop reason: HOSPADM

## 2019-06-18 RX ORDER — MAGNESIUM HYDROXIDE 1200 MG/15ML
LIQUID ORAL PRN
Status: DISCONTINUED | OUTPATIENT
Start: 2019-06-18 | End: 2019-06-18 | Stop reason: HOSPADM

## 2019-06-18 RX ORDER — FENTANYL CITRATE 50 UG/ML
25-50 INJECTION, SOLUTION INTRAMUSCULAR; INTRAVENOUS
Status: DISCONTINUED | OUTPATIENT
Start: 2019-06-18 | End: 2019-06-18 | Stop reason: HOSPADM

## 2019-06-18 RX ORDER — ONDANSETRON 4 MG/1
4 TABLET, ORALLY DISINTEGRATING ORAL EVERY 30 MIN PRN
Status: DISCONTINUED | OUTPATIENT
Start: 2019-06-18 | End: 2019-06-18 | Stop reason: HOSPADM

## 2019-06-18 RX ORDER — HYDROXYZINE HYDROCHLORIDE 25 MG/1
25 TABLET, FILM COATED ORAL EVERY 6 HOURS PRN
Status: DISCONTINUED | OUTPATIENT
Start: 2019-06-18 | End: 2019-06-19

## 2019-06-18 RX ORDER — LORAZEPAM 1 MG/1
1 TABLET ORAL EVERY 4 HOURS PRN
Status: DISCONTINUED | OUTPATIENT
Start: 2019-06-18 | End: 2019-06-22 | Stop reason: HOSPADM

## 2019-06-18 RX ORDER — ACETAMINOPHEN 325 MG/1
650 TABLET ORAL EVERY 4 HOURS PRN
Status: DISCONTINUED | OUTPATIENT
Start: 2019-06-21 | End: 2019-06-22 | Stop reason: HOSPADM

## 2019-06-18 RX ORDER — SODIUM CHLORIDE, SODIUM LACTATE, POTASSIUM CHLORIDE, CALCIUM CHLORIDE 600; 310; 30; 20 MG/100ML; MG/100ML; MG/100ML; MG/100ML
INJECTION, SOLUTION INTRAVENOUS CONTINUOUS
Status: DISCONTINUED | OUTPATIENT
Start: 2019-06-18 | End: 2019-06-21

## 2019-06-18 RX ORDER — CEFAZOLIN SODIUM 1 G/50ML
1 INJECTION, SOLUTION INTRAVENOUS EVERY 8 HOURS
Status: COMPLETED | OUTPATIENT
Start: 2019-06-18 | End: 2019-06-19

## 2019-06-18 RX ORDER — BUPIVACAINE HYDROCHLORIDE AND EPINEPHRINE 2.5; 5 MG/ML; UG/ML
INJECTION, SOLUTION INFILTRATION; PERINEURAL PRN
Status: DISCONTINUED | OUTPATIENT
Start: 2019-06-18 | End: 2019-06-18 | Stop reason: HOSPADM

## 2019-06-18 RX ORDER — METOPROLOL TARTRATE 1 MG/ML
1-2 INJECTION, SOLUTION INTRAVENOUS EVERY 5 MIN PRN
Status: DISCONTINUED | OUTPATIENT
Start: 2019-06-18 | End: 2019-06-18 | Stop reason: HOSPADM

## 2019-06-18 RX ORDER — EPHEDRINE SULFATE 50 MG/ML
INJECTION, SOLUTION INTRAMUSCULAR; INTRAVENOUS; SUBCUTANEOUS PRN
Status: DISCONTINUED | OUTPATIENT
Start: 2019-06-18 | End: 2019-06-18

## 2019-06-18 RX ORDER — HYDROMORPHONE HYDROCHLORIDE 1 MG/ML
.5-1 INJECTION, SOLUTION INTRAMUSCULAR; INTRAVENOUS; SUBCUTANEOUS
Status: DISCONTINUED | OUTPATIENT
Start: 2019-06-18 | End: 2019-06-19

## 2019-06-18 RX ORDER — CYCLOBENZAPRINE HCL 10 MG
10 TABLET ORAL 3 TIMES DAILY PRN
Status: DISCONTINUED | OUTPATIENT
Start: 2019-06-18 | End: 2019-06-22 | Stop reason: HOSPADM

## 2019-06-18 RX ORDER — GLYCOPYRROLATE 0.2 MG/ML
INJECTION, SOLUTION INTRAMUSCULAR; INTRAVENOUS PRN
Status: DISCONTINUED | OUTPATIENT
Start: 2019-06-18 | End: 2019-06-18

## 2019-06-18 RX ORDER — CELECOXIB 200 MG/1
200 CAPSULE ORAL 2 TIMES DAILY
Status: COMPLETED | OUTPATIENT
Start: 2019-06-18 | End: 2019-06-20

## 2019-06-18 RX ORDER — AMOXICILLIN 250 MG
1 CAPSULE ORAL 2 TIMES DAILY
Status: DISCONTINUED | OUTPATIENT
Start: 2019-06-18 | End: 2019-06-22 | Stop reason: HOSPADM

## 2019-06-18 RX ORDER — LIDOCAINE 40 MG/G
CREAM TOPICAL
Status: DISCONTINUED | OUTPATIENT
Start: 2019-06-18 | End: 2019-06-22 | Stop reason: HOSPADM

## 2019-06-18 RX ORDER — ONDANSETRON 4 MG/1
4 TABLET, ORALLY DISINTEGRATING ORAL EVERY 6 HOURS PRN
Status: DISCONTINUED | OUTPATIENT
Start: 2019-06-18 | End: 2019-06-22 | Stop reason: HOSPADM

## 2019-06-18 RX ORDER — ONDANSETRON 2 MG/ML
INJECTION INTRAMUSCULAR; INTRAVENOUS PRN
Status: DISCONTINUED | OUTPATIENT
Start: 2019-06-18 | End: 2019-06-18

## 2019-06-18 RX ORDER — ONDANSETRON 2 MG/ML
4 INJECTION INTRAMUSCULAR; INTRAVENOUS EVERY 6 HOURS PRN
Status: DISCONTINUED | OUTPATIENT
Start: 2019-06-18 | End: 2019-06-22 | Stop reason: HOSPADM

## 2019-06-18 RX ORDER — LIDOCAINE HYDROCHLORIDE 10 MG/ML
INJECTION, SOLUTION INFILTRATION; PERINEURAL PRN
Status: DISCONTINUED | OUTPATIENT
Start: 2019-06-18 | End: 2019-06-18

## 2019-06-18 RX ORDER — HYDROMORPHONE HYDROCHLORIDE 1 MG/ML
INJECTION, SOLUTION INTRAMUSCULAR; INTRAVENOUS; SUBCUTANEOUS
Status: DISPENSED
Start: 2019-06-18 | End: 2019-06-19

## 2019-06-18 RX ORDER — HYDROMORPHONE HYDROCHLORIDE 1 MG/ML
.3-.5 INJECTION, SOLUTION INTRAMUSCULAR; INTRAVENOUS; SUBCUTANEOUS EVERY 5 MIN PRN
Status: DISCONTINUED | OUTPATIENT
Start: 2019-06-18 | End: 2019-06-18 | Stop reason: HOSPADM

## 2019-06-18 RX ADMIN — CYCLOBENZAPRINE HYDROCHLORIDE 10 MG: 10 TABLET, FILM COATED ORAL at 13:14

## 2019-06-18 RX ADMIN — HYDROMORPHONE HYDROCHLORIDE 0.5 MG: 1 INJECTION, SOLUTION INTRAMUSCULAR; INTRAVENOUS; SUBCUTANEOUS at 05:33

## 2019-06-18 RX ADMIN — MIDAZOLAM 1 MG: 1 INJECTION INTRAMUSCULAR; INTRAVENOUS at 07:32

## 2019-06-18 RX ADMIN — FENTANYL CITRATE 100 MCG: 50 INJECTION, SOLUTION INTRAMUSCULAR; INTRAVENOUS at 07:33

## 2019-06-18 RX ADMIN — SODIUM CHLORIDE, POTASSIUM CHLORIDE, SODIUM LACTATE AND CALCIUM CHLORIDE: 600; 310; 30; 20 INJECTION, SOLUTION INTRAVENOUS at 06:30

## 2019-06-18 RX ADMIN — PHENYLEPHRINE HYDROCHLORIDE 200 MCG: 10 INJECTION INTRAVENOUS at 08:59

## 2019-06-18 RX ADMIN — HYDROMORPHONE HYDROCHLORIDE 0.5 MG: 1 INJECTION, SOLUTION INTRAMUSCULAR; INTRAVENOUS; SUBCUTANEOUS at 12:55

## 2019-06-18 RX ADMIN — FENTANYL CITRATE 50 MCG: 50 INJECTION INTRAMUSCULAR; INTRAVENOUS at 10:38

## 2019-06-18 RX ADMIN — CELECOXIB 200 MG: 200 CAPSULE ORAL at 20:11

## 2019-06-18 RX ADMIN — ACETAMINOPHEN 975 MG: 325 TABLET, FILM COATED ORAL at 21:54

## 2019-06-18 RX ADMIN — Medication 15 MG: at 07:50

## 2019-06-18 RX ADMIN — Medication 1 MG: at 01:06

## 2019-06-18 RX ADMIN — OXYCODONE HYDROCHLORIDE 10 MG: 5 TABLET ORAL at 17:03

## 2019-06-18 RX ADMIN — Medication 2 MG: at 09:32

## 2019-06-18 RX ADMIN — PROPOFOL 150 MG: 10 INJECTION, EMULSION INTRAVENOUS at 07:33

## 2019-06-18 RX ADMIN — ROCURONIUM BROMIDE 10 MG: 10 INJECTION INTRAVENOUS at 07:57

## 2019-06-18 RX ADMIN — ACETAMINOPHEN 975 MG: 325 TABLET, FILM COATED ORAL at 14:20

## 2019-06-18 RX ADMIN — FENTANYL CITRATE 50 MCG: 50 INJECTION INTRAMUSCULAR; INTRAVENOUS at 10:24

## 2019-06-18 RX ADMIN — FENTANYL CITRATE 50 MCG: 50 INJECTION INTRAMUSCULAR; INTRAVENOUS at 12:06

## 2019-06-18 RX ADMIN — SODIUM CHLORIDE, POTASSIUM CHLORIDE, SODIUM LACTATE AND CALCIUM CHLORIDE: 600; 310; 30; 20 INJECTION, SOLUTION INTRAVENOUS at 09:14

## 2019-06-18 RX ADMIN — GLYCOPYRROLATE 0.2 MG: 0.2 INJECTION, SOLUTION INTRAMUSCULAR; INTRAVENOUS at 07:48

## 2019-06-18 RX ADMIN — GUANFACINE 2 MG: 1 TABLET ORAL at 21:58

## 2019-06-18 RX ADMIN — OXYCODONE HYDROCHLORIDE 5 MG: 5 TABLET ORAL at 01:06

## 2019-06-18 RX ADMIN — OXYCODONE HYDROCHLORIDE 10 MG: 5 TABLET ORAL at 13:40

## 2019-06-18 RX ADMIN — FENTANYL CITRATE 50 MCG: 50 INJECTION INTRAMUSCULAR; INTRAVENOUS at 11:07

## 2019-06-18 RX ADMIN — GLYCOPYRROLATE 0.4 MG: 0.2 INJECTION, SOLUTION INTRAMUSCULAR; INTRAVENOUS at 09:32

## 2019-06-18 RX ADMIN — PHENYLEPHRINE HYDROCHLORIDE 200 MCG: 10 INJECTION INTRAVENOUS at 08:37

## 2019-06-18 RX ADMIN — TRAZODONE HYDROCHLORIDE 100 MG: 50 TABLET ORAL at 21:58

## 2019-06-18 RX ADMIN — FENTANYL CITRATE 150 MCG: 50 INJECTION, SOLUTION INTRAMUSCULAR; INTRAVENOUS at 07:57

## 2019-06-18 RX ADMIN — HYDROMORPHONE HYDROCHLORIDE 1 MG: 1 INJECTION, SOLUTION INTRAMUSCULAR; INTRAVENOUS; SUBCUTANEOUS at 15:18

## 2019-06-18 RX ADMIN — CEFAZOLIN SODIUM 1 G: 1 INJECTION, SOLUTION INTRAVENOUS at 17:02

## 2019-06-18 RX ADMIN — LIDOCAINE HYDROCHLORIDE 30 MG: 10 INJECTION, SOLUTION INFILTRATION; PERINEURAL at 07:33

## 2019-06-18 RX ADMIN — HYDROMORPHONE HYDROCHLORIDE 0.5 MG: 1 INJECTION, SOLUTION INTRAMUSCULAR; INTRAVENOUS; SUBCUTANEOUS at 12:36

## 2019-06-18 RX ADMIN — SODIUM CHLORIDE, POTASSIUM CHLORIDE, SODIUM LACTATE AND CALCIUM CHLORIDE: 600; 310; 30; 20 INJECTION, SOLUTION INTRAVENOUS at 11:54

## 2019-06-18 RX ADMIN — HYDROXYZINE HYDROCHLORIDE 25 MG: 25 TABLET ORAL at 14:45

## 2019-06-18 RX ADMIN — SODIUM CHLORIDE, POTASSIUM CHLORIDE, SODIUM LACTATE AND CALCIUM CHLORIDE: 600; 310; 30; 20 INJECTION, SOLUTION INTRAVENOUS at 17:55

## 2019-06-18 RX ADMIN — PHENYLEPHRINE HYDROCHLORIDE 100 MCG: 10 INJECTION INTRAVENOUS at 09:18

## 2019-06-18 RX ADMIN — ONDANSETRON 4 MG: 2 INJECTION INTRAMUSCULAR; INTRAVENOUS at 09:23

## 2019-06-18 RX ADMIN — HYDROMORPHONE HYDROCHLORIDE 1 MG: 1 INJECTION, SOLUTION INTRAMUSCULAR; INTRAVENOUS; SUBCUTANEOUS at 14:18

## 2019-06-18 RX ADMIN — GABAPENTIN 300 MG: 300 CAPSULE ORAL at 20:11

## 2019-06-18 RX ADMIN — LORAZEPAM 1 MG: 1 TABLET ORAL at 12:52

## 2019-06-18 RX ADMIN — Medication 10 MG: at 08:18

## 2019-06-18 RX ADMIN — ROCURONIUM BROMIDE 40 MG: 10 INJECTION INTRAVENOUS at 07:33

## 2019-06-18 RX ADMIN — HYDROXYZINE HYDROCHLORIDE 25 MG: 25 TABLET ORAL at 22:07

## 2019-06-18 RX ADMIN — Medication 1 SPRAY: at 14:20

## 2019-06-18 RX ADMIN — SODIUM CHLORIDE, POTASSIUM CHLORIDE, SODIUM LACTATE AND CALCIUM CHLORIDE: 600; 310; 30; 20 INJECTION, SOLUTION INTRAVENOUS at 14:26

## 2019-06-18 RX ADMIN — Medication 1 G: at 07:50

## 2019-06-18 RX ADMIN — CEFAZOLIN SODIUM 1 G: 2 INJECTION, SOLUTION INTRAVENOUS at 07:30

## 2019-06-18 RX ADMIN — CEFAZOLIN SODIUM 1 G: 2 INJECTION, SOLUTION INTRAVENOUS at 09:21

## 2019-06-18 RX ADMIN — GABAPENTIN 300 MG: 300 CAPSULE ORAL at 14:24

## 2019-06-18 RX ADMIN — OXYCODONE HYDROCHLORIDE 10 MG: 5 TABLET ORAL at 22:53

## 2019-06-18 RX ADMIN — GLYCOPYRROLATE 0.1 MG: 0.2 INJECTION, SOLUTION INTRAMUSCULAR; INTRAVENOUS at 09:18

## 2019-06-18 RX ADMIN — HYDROMORPHONE HYDROCHLORIDE 1 MG: 1 INJECTION, SOLUTION INTRAMUSCULAR; INTRAVENOUS; SUBCUTANEOUS at 13:20

## 2019-06-18 RX ADMIN — SODIUM CHLORIDE, POTASSIUM CHLORIDE, SODIUM LACTATE AND CALCIUM CHLORIDE: 600; 310; 30; 20 INJECTION, SOLUTION INTRAVENOUS at 08:02

## 2019-06-18 RX ADMIN — PHENYLEPHRINE HYDROCHLORIDE 200 MCG: 10 INJECTION INTRAVENOUS at 09:42

## 2019-06-18 ASSESSMENT — ACTIVITIES OF DAILY LIVING (ADL)
ADLS_ACUITY_SCORE: 14
ADLS_ACUITY_SCORE: 16
ADLS_ACUITY_SCORE: 14
ADLS_ACUITY_SCORE: 14

## 2019-06-18 ASSESSMENT — ENCOUNTER SYMPTOMS: DYSRHYTHMIAS: 0

## 2019-06-18 NOTE — BRIEF OP NOTE
[facility]    Brief Operative Note    Pre-operative diagnosis: fracture  Post-operative diagnosis Right displaced femoral neck fracture  Procedure: Procedure(s):  Right  total hip arthroplasty  Surgeon: Surgeon(s) and Role:     * Zaheer Zhou MD - Primary     * Estelita Delgado PA - Assisting  Anesthesia: General   Estimated blood loss: 300 ml  Drains: Hemovac  Specimens:   ID Type Source Tests Collected by Time Destination   A : right hip femoral head Bone Hip, Right SURGICAL PATHOLOGY EXAM Zaheer Zhou MD 6/18/2019  9:20 AM      Findings:   None.  Complications: None.  Implants:    Implant Name Type Inv. Item Serial No.  Lot No. LRB No. Used   IMP SHELL ACETABULUM HOWM 54MM 542-11-54F Total Joint Component/Insert IMP SHELL ACETABULUM HOWM 54MM 542-11-54F  [employer] [id number] Right 1   IMP LINER STRK TRIDENT X3 POLY 36MM 0DEG [facility] 623-00-36F Total Joint Component/Insert IMP LINER STRK TRIDENT X3 POLY 36MM 0DEG [facility] 623-00-36F  [employer] [id number] Right 1   IMP SCR BONE STRK TORX 6.5X25MM CAN 2607-6594-1 [employer] IMP SCR BONE STRK TORX 6.5X25MM CAN 8434-3544-1  [employer] [id number] Right 1   IMP SCR BONE STRK TORX 6.5X25MM CAN [id number] Metallic [employer] IMP SCR BONE STRK TORX 6.5X25MM CAN [id number]  [employer] [id number] Right 1   IMP STEM FEMORAL HIP STRK ACCOLADE II 132DEG  5 6026-9100 Total Joint Component/Insert IMP STEM FEMORAL HIP STRK ACCOLADE II 132DEG  5 3270-5072  [employer] [id number] Right 1   IMP HEAD FEMORAL STRK BIOLOX DELTA CERAMIC 36MM +2.5MM Total Joint Component/Insert IMP HEAD FEMORAL STRK BIOLOX DELTA CERAMIC 36MM +2.5MM  [employer] [id number] Right 1

## 2019-06-18 NOTE — ANESTHESIA PREPROCEDURE EVALUATION
Anesthesia Pre-Procedure Evaluation    Patient: Greyson Rogers   MRN: [id number] : 1960          Preoperative Diagnosis: fracture    Procedure(s):  HEMIARTHROPLASTY, HIP, BIPOLAR. possible total - Right  ARTHROPLASTY, HIP    Past Medical History:   Diagnosis Date     Depressive disorder      History reviewed. No pertinent surgical history.  Anesthesia Evaluation     .             ROS/MED HX    ENT/Pulmonary:      (-) asthma and sleep apnea   Neurologic:      (-) TIA, Other neuro hx and Dementia   Cardiovascular:        (-) hypertension, CAD, CHF, arrhythmias, pulmonary hypertension and dyslipidemia   METS/Exercise Tolerance:     Hematologic:         Musculoskeletal:   (+) fracture lower extremity: Hip, other musculoskeletal- Chronic Low Back PAin      GI/Hepatic:        (-) GERD, hiatal hernia and hepatitis   Renal/Genitourinary:      (-) renal disease   Endo:      (-) Type I DM, Type II DM, thyroid disease, chronic steroid usage, other endocrine disorder and obesity   Psychiatric:     (+) psychiatric history anxiety and depression      Infectious Disease:  - neg infectious disease ROS       Malignancy:      - no malignancy   Other:    - neg other ROS                      Physical Exam      Airway   Mallampati: II  TM distance: >3 FB  Neck ROM: full    Dental     Cardiovascular   Rhythm and rate: regular and normal  (-) no murmur    Pulmonary    breath sounds clear to auscultation    Other findings: Lab Test        19                       0510          1648          WBC           --          13.3*         HGB           --          13.4          MCV           --          87            PLT           --          240           INR          1.08         1.04           Lab Test        19                       1648          NA           142           POTASSIUM    4.6           CHLORIDE     109           CO2          29            BUN          22            CR           0.97         "  ANIONGAP     4             ESTHER          8.8           GLC          127*                Lab Results   Component Value Date    WBC 13.3 (H) 06/17/2019    HGB 13.4 06/17/2019    HCT 40.4 06/17/2019     06/17/2019     06/17/2019    POTASSIUM 4.6 06/17/2019    CHLORIDE 109 06/17/2019    CO2 29 06/17/2019    BUN 22 06/17/2019    CR 0.97 06/17/2019     (H) 06/17/2019    ESTHER 8.8 06/17/2019    INR 1.08 06/18/2019       Preop Vitals  BP Readings from Last 3 Encounters:   06/18/19 101/57    Pulse Readings from Last 3 Encounters:   06/17/19 (!) 16      Resp Readings from Last 3 Encounters:   06/18/19 16    SpO2 Readings from Last 3 Encounters:   06/18/19 95%      Temp Readings from Last 1 Encounters:   06/18/19 99.1  F (37.3  C) (Temporal)    Ht Readings from Last 1 Encounters:   06/17/19 1.753 m (5' 9\")      Wt Readings from Last 1 Encounters:   06/17/19 70.3 kg (155 lb)    Estimated body mass index is 22.89 kg/m  as calculated from the following:    Height as of this encounter: 1.753 m (5' 9\").    Weight as of this encounter: 70.3 kg (155 lb).       Anesthesia Plan      History & Physical Review  History and physical reviewed and following examination; no interval change.    ASA Status:  3 .    NPO Status:  > 8 hours    Plan for General and ETT with Propofol induction. Maintenance will be Balanced.    PONV prophylaxis:  Ondansetron (or other 5HT-3)       Postoperative Care  Postoperative pain management:  IV analgesics and Oral pain medications.      Consents  Anesthetic plan, risks, benefits and alternatives discussed with:  Patient..                 Arsen Hudson MD                    .  "

## 2019-06-18 NOTE — CONSULTS
Consult Date:  06/18/2019      ORTHOPEDIC CONSULTATION      CHIEF COMPLAINT:  Right hip fracture.      HISTORY OF PRESENT ILLNESS:  Dr. Bell of the Hospitalist at [facility] requested Orthopedic consultation for Greyson's right hip fracture.  Greyson is a fairly healthy 59-year-old female who was out walking her dog when she tripped, landing directly on her right hip and was unable to ambulate.  She was brought to the ER and x-rays showed a displaced femoral neck fracture.  Based on this, she was admitted to be medically optimized and our consult was requested.  At this point today, she denies any other musculoskeletal complaints.  She points directly to her right hip as the source of pain.  She does not want to move it.  She denies numbness and tingling in her leg.  Denies any other issues.      PAST MEDICAL HISTORY:  Anxiety, hyperlipidemia, chronic low back pain and hypotension.      PAST SURGICAL HISTORY:  Breast biopsy.      SOCIAL HISTORY:  She is a nonsmoker.  Does not use alcohol.  She is currently .      FAMILY HISTORY:  Osteoporosis and breast cancer.  No history of any DVT or PE.      ALLERGIES:  NONE.      MEDICATIONS:  Please see intake.  Do note that she is on Wellbutrin, Cymbalta, Neurontin, Ativan, Anaprox and Desyrel.        REVIEW OF SYSTEMS:  The patient denies fever, chills, chest pain.  Denies any other musculoskeletal complaints.  Denies numbness and tingling into the legs.      PHYSICAL EXAMINATION:     GENERAL:  Greyson is alert and oriented x3, very pleasant, answers questions appropriately; however, she does appear to be somewhat distressed with slight movement of her hip.  She is breathing comfortably.  She is now obese.     MUSCULOSKELETAL:  She will not perform a straight leg raise and does not want to let the leg move.  It is slightly internally rotated and shortened.  EHL, tibia and gastroc strength is 5/5.   SKIN:  Intact with only mild subjective numbness with light  palpation throughout the calf and foot area.      X-rays reviewed show a displaced femoral neck fracture with no evidence for arthritis.      IMPRESSION:  Right hip displaced femoral neck fracture.      PLAN:  I discussed with Greyson based on her age, I would recommend treatment with either hemiarthroplasty or a total hip arthroplasty.  We discussed the procedure in detail, including the risks, benefits and alternatives.  We did discuss some of the increased risks for total hip arthroplasty; however, based on her young age, data has shown that this may be the better option for her even though there are greater risks of things such as dislocation, leg length inequality, increased blood loss as well as decreased operative complications.  We also discussed some of the other operative concerns we have in regards to anesthesia.  At this point, she will leave that decision to us in regards to what we found in her op, especially if we did see any arthritis, then we for sure would go ahead with a total hip arthroplasty, but again we felt a total hip would give her the best option for both the short and long-term.  She is in agreement with this plan.  She has been medically cleared.  We will plan on proceeding later this afternoon.  She will continue to be n.p.o.         LALY BURR MD             D: 2019   T: 2019   MT: THIERRY      Name:     GREYSON ALMEIDA   MRN:      [id number]        Account:       [id number]   :      1960           Consult Date:  2019      Document: [id number]       cc: Shakira Edwards NP

## 2019-06-18 NOTE — OP NOTE
Procedure Date: 06/18/2019      PREOPERATIVE DIAGNOSIS:  Displaced right femoral neck fracture.      POSTOPERATIVE DIAGNOSIS:  Displaced right femoral neck fracture.      PROCEDURE PERFORMED:  Right total hip arthroplasty.      SURGEON:  Zaheer Zhou MD.      ASSISTANT:  Marcellus Delgado PA-C.      ANESTHESIA:  General and local.      ESTIMATED BLOOD LOSS:  250 mL.      DRAINS:  One Hemovac.      COMPLICATIONS:  None apparent.       INDICATIONS FOR PROCEDURE:  Greyson is a healthy 59-year-old female who had a twisting injury and a fall yesterday.  She was brought to the emergency room where a displaced femoral neck fracture was identified.  Preoperatively, we discussed both bipolar hemiarthroplasty and total hip arthroplasty.  We did discuss based on her young age of 59 that a total may give her a better long-term outcome.  We did discuss risks, benefits and alternatives to both of these.  These included with total hip arthroplasty, increased risk of dislocation, a more risky intraoperative procedure with more blood loss.  We did discuss potential for leg length inequalities, dislocation, infection, blood clots, as well as anesthetic complications.  We also discussed that if she had any significant arthritis, we would perform a total hip regardless.  After full discussion, she consented to proceed.      DESCRIPTION OF PROCEDURE:  Greyson was brought to the operating room, placed supine on the operating room table while general endotracheal anesthesia was administered.  She was then placed in the lateral decubitus with the right hip up, prepped and draped in the usual sterile fashion for total hip arthroplasty.  After a timeout confirming the appropriate limb, standard lateral incision directly over the greater trochanter was undertaken with sharp dissection down to the IT band.  The IT band was split longitudinally, and the East-West was placed.  We removed some of the bursal tissue, then took the anterior third of the  abductors off to identify the capsule.  Capsulotomy was performed and retracted.  There was just some slight chondromalacia in the hip, but based on her young age and that she was so healthy with minimal medical issues, we decided to proceed with a total hip arthroplasty.  The head was removed and measured to be a 51.  We then removed the pulvinar and labrum and placed our retractors.  We reamed starting with a 44, we reamed sequentially up to a 54 and performed a trial.  We were pleased with the cup position and stability.  We then used a 55 reamer on the outer lip to remove some of the overhang and cleaned up some more of the soft tissues.  We then impacted the cup without difficulty.  We placed two 25 mm screws that gave us excellent purchase.  Based on how happy we were with the cup as we were about 40 degrees of abduction and 10 degrees of anteversion, we placed a standard poly, and this was impacted in place and deemed to be stable.        We then turned our attention to the femur.  We used our cookie cutter, canal finder and lateralizer to open up the canal.  We then broached starting with a 0, broached up to a 5, which gave us excellent fit.  We did trial with a +0, which we were concerned of being slightly short on our length and we did get an intraoperative x-ray, which showed good fit of the stem and based on what we could tell from leg lengths, we were slightly shorter on that side, so we did remove the broach, placed our size 5 stem.  We then again trialed with the 0 and the +2.5 and felt that the +2.5 gave us better leg lengths and better stability.  Once that was performed, we did a 3-minute Betadine soak.  We injected the periacetabular tissues with Marcaine and epinephrine.  We then irrigated copiously.  We impacted the final +2 ceramic head in, relocated the hip, brought it through range of motion.  She had excellent stability both posteriorly and anteriorly and leg lengths again felt good.  With  that completed, we then copiously irrigated the hip.        We then closed the capsule with interrupted #1 Vicryls.  We then closed the anterior third of the abductors with interrupted #1 Vicryl as well as a #5 Ethibond through drill holes.  We then placed a deep drain, closed the IT band with #1 Vicryl interrupteds.  We then used 2-0 in the subcutaneous tissue, staples on the skin.  Of note, a drain was placed prior to closure.  We then placed sterile dressings.  She was placed into her pillow and brought to the PACU in stable condition.  Needle and sponge counts were correct.      PLAN:  The patient will be weightbear as tolerates, anterior approach precautions, Xarelto transitioning to enteric-coated aspirin for DVT prophylaxis as well as Pneumoboots.  She was given 2 grams of Ancef within an hour of the procedure and this will be discontinued within 24 hours.         LALY BURR MD             D: 2019   T: 2019   MT:       Name:     KEREN ALMEIDA   MRN:      [id number]        Account:        [id number]   :      1960           Procedure Date: 2019      Document: [id number]       cc: Shakira Burr MD

## 2019-06-18 NOTE — PROVIDER NOTIFICATION
Pt is in severe pain, unable to turn and repo. Can we give another dose of IV dilaudid, currently too soon, and  is it possible to place a garcia? Please advise. TY

## 2019-06-18 NOTE — PROGRESS NOTES
[facility]  Hospitalist Progress Note  Julio Cesar Bell MD 06/18/19    Reason for Stay (Diagnosis): Right femoral neck fracture         Assessment and Plan:      Summary of Stay:   Greyson Rogers is a 59 year old female with PMH including MDD, anxiety, HLD, and chronic low back pain who was admitted on 6/17/2019 with following a mechanical fall landing on her right hip resulting in a right femoral neck fracture on pelvis x-ray.  Orthopedics consulted and underwent right EVAN in 6/18.  Significant postoperative pain.    Problem List/Assessment and Plan:   Mechanical fall, right femoral neck fracture:  Patient tripped while walking her dog and landed on her right hip.  Unable to ambulate secondary to pain.  No other areas of pain.  Pelvis x-ray showing a impacted right femoral neck fracture.   -Orthopedics consulted, underwent right EVAN 6/18  -In severe pain after arrival from PACU.  Initially increase IV Dilaudid to 0.5-1 mg every hour as needed, back off as an initial pain control improved.  Oral oxycodone 5-10 mg p.o. every 3 hours as needed, acetaminophen, Celebrex, add Flexeril 10 mg daily as needed for back spasms  -Capnography and monitor for any excessive sedation  -PT, OT consult  -Xarelto for DVT prophylaxis per orthopedics     MDD, anxiety: Continue PTA Wellbutrin  mg daily, Cymbalta 80 mg daily, guanfacine 2 mg daily, trazodone 100 mg at bedtime.  Increase Dell of her her PTA Lorazepam to 1 mg p.o. every 4 hours as needed.     HLD, hypertriglyceridemia: Not currently on medications for this.     Chronic low back pain: Degenerative disease on previous x-rays.  Continue PTA gabapentin 300 mg 3 times daily.     Soft blood pressure: Pressure 102/52.  Suspect a chronic issue.  Vital signs at last care everywhere visit showed a blood pressure of 90/56.  Received some phenylephrine intraoperatively, BP okay postop.    DVT Prophylaxis: Rivaroxaban per orthopedics  Code Status: Full Code  FEN:  "ADAT, LR until adequate p.o. intake  Discharge Dispo: tbd, PT, OT, social work consult  Estimated Disch Date / # of Days until Disch: 2 days        Interval History (Subjective):      Patient seen shortly after return from PACU following right EVAN.  Patient in severe pain despite receiving 0.5 mg IV Dilaudid 30 minutes previous and 1 mg p.o. Ativan.  Writhing in pain and complaining of right hip and back spasms.  Is not somnolent.                  Physical Exam:      Last Vital Signs:  BP (!) 123/33 (BP Location: Left arm)   Pulse 61   Temp 98.2  F (36.8  C) (Oral)   Resp (!) 41   Ht 1.753 m (5' 9\")   Wt 70.3 kg (155 lb)   SpO2 97%   BMI 22.89 kg/m        Intake/Output Summary (Last 24 hours) at 6/18/2019 1337  Last data filed at 6/18/2019 1115  Gross per 24 hour   Intake 3720 ml   Output 1525 ml   Net 2195 ml       Constitutional: Awake, appears in distress due to pain  Eyes: sclera white   HEENT:  MMM   Respiratory: no crackles or wheeze  Cardiovascular: RRR.  No murmur   GI: non-tender, not distended, bowel sounds present  Skin: no rash    Musculoskeletal/extremities: No lower extremity edema  Neurologic: Alert  Psychiatric: Very anxious, crying out in pain         Medications:      All current medications were reviewed with changes reflected in problem list.         Data:      All new lab and imaging data was reviewed.   Labs:  Recent Labs   Lab 06/17/19  1648      POTASSIUM 4.6   CHLORIDE 109   CO2 29   ANIONGAP 4   *   BUN 22   CR 0.97   GFRESTIMATED 63   GFRESTBLACK 73   ESTHER 8.8     Recent Labs   Lab 06/17/19  1648   WBC 13.3*   HGB 13.4   HCT 40.4   MCV 87         Imaging:   None today      Julio Cesar Bell MD        "

## 2019-06-18 NOTE — PLAN OF CARE
Pt arrived to floor on bed around 1230, pt was screaming, moaning, clenching, crying in pain. Sharp, spasms down RLE. Administered IV dilaudid. Dr. Pan came to room gave a verbal for another dose than increased dosing. Pt received all available pain meds appropriately, around 1500 pt finally started rating pain around an 8. Pt has severe anxiety, prn ativan and atarax. For pain meds, IV dilaudid, oxy 10mg, flexeril, gabapentin, tylenol. 2L NC, capno on. Writer was in room for around 2.5 hours, calming pt and doing breathing exercises. Utilized aromatherapy, calming music, relaxation techniques, breathing techniques, ice chips, massage, ice to incision, prayer. BS hypo, LS clear. Moderat dorsi/plantar. Drsg CDI. Full liquid tolerated. Passing flatus. Ward in place. Hemovac patent and draining. Pt is tremorous at times she states its d/t depression medication. Plan to continue pain management and dangle when pt's anxiety, pain are undercontrol.

## 2019-06-18 NOTE — ANESTHESIA POSTPROCEDURE EVALUATION
Patient: Greyson Rogers    Procedure(s):  Right  total hip arthroplasty    Diagnosis:fracture  Diagnosis Additional Information: No value filed.    Anesthesia Type:  General, ETT    Note:  Anesthesia Post Evaluation    Patient location during evaluation: PACU  Patient participation: Able to fully participate in evaluation  Level of consciousness: awake  Pain management: adequate  Airway patency: patent  Cardiovascular status: acceptable  Respiratory status: acceptable  Hydration status: euvolemic  PONV: controlled     Anesthetic complications: None          Last vitals:  Vitals:    06/18/19 1323 06/18/19 1427 06/18/19 1446   BP: (!) 123/33 138/63 163/58   Pulse:   70   Resp: (!) 41  25   Temp:      SpO2: 97% 96% 97%         Electronically Signed By: Arsen Hudson MD  June 18, 2019  3:14 PM

## 2019-06-18 NOTE — PROGRESS NOTES
SPIRITUAL HEALTH SERVICES Progress Note  [facility] Ortho 6    Spiritual Health Services visit per routine hospital  request.  Assisted pt in pain management through breathing and relaxation techniques.   Pt is [employer] and requested prayer for comfort.    Plan: Will continue to follow while on unit. Spiritual Health Services remains available for additional emotional/spiritual support.    Jerome Aranda MA  Staff   Pager: [phone]  Phone: [phone]

## 2019-06-18 NOTE — ANESTHESIA CARE TRANSFER NOTE
Patient: Greyson Rogers    Procedure(s):  Right  total hip arthroplasty    Diagnosis: fracture  Diagnosis Additional Information: No value filed.    Anesthesia Type:   General, ETT     Note:  Airway :Face Mask  Patient transferred to:PACU  Handoff Report: Identifed the Patient, Identified the Reponsible Provider, Reviewed the pertinent medical history, Discussed the surgical course, Reviewed Intra-OP anesthesia mangement and issues during anesthesia, Set expectations for post-procedure period and Allowed opportunity for questions and acknowledgement of understanding      Vitals: (Last set prior to Anesthesia Care Transfer)    CRNA VITALS  6/18/2019 0930 - 6/18/2019 1008      6/18/2019             Pulse:  86    SpO2:  95 %    Resp Rate (observed):  8                Electronically Signed By: KIMBERLEY Tracey CRNA  June 18, 2019  10:08 AM

## 2019-06-18 NOTE — PROVIDER NOTIFICATION
Can pt have an order for O2? Currently at 88-low 90's. BP's 90's over mid 40's. IV dilaudid given x2 and oxycodone given x1. I put her on continuous pulse Ox to monitor. Please advise. MARCELA GARCIA called back: Ok to put pt on 1L O2 and continue to moitor.

## 2019-06-18 NOTE — PLAN OF CARE
AOx4, bed rest, Ax2 to assist with turning, VSS. Right hip is painful to touch, surgical prep/wash completed. Ward cath placed with adequate amount of urine output. PRN oxycodone and IV dilaudid given for pain, Ice applied to R hip. Pt is to be NPO at midnight for surgery at  7:30 am. Will continue to monitor.

## 2019-06-18 NOTE — PROGRESS NOTES
Pt alert and oriented, lung sounds clear. Soft bps. On bedrest,npo since midnight.Pain controlled with prn Oxy with iv dilaudid.Ward patent and draining.Surgery scheduled this morning. Will continue to monitor.

## 2019-06-19 ENCOUNTER — APPOINTMENT (OUTPATIENT)
Dept: PHYSICAL THERAPY | Facility: CLINIC | Age: 59
End: 2019-06-19
Attending: INTERNAL MEDICINE
Payer: COMMERCIAL

## 2019-06-19 LAB
BASE EXCESS BLDV CALC-SCNC: 4.9 MMOL/L
COPATH REPORT: NORMAL
GLUCOSE SERPL-MCNC: 116 MG/DL (ref 70–99)
HCO3 BLDV-SCNC: 30 MMOL/L (ref 21–28)
HGB BLD-MCNC: 9 G/DL (ref 11.7–15.7)
HGB BLD-MCNC: 9.2 G/DL (ref 11.7–15.7)
LACTATE BLD-SCNC: 1.4 MMOL/L (ref 0.7–2)
O2/TOTAL GAS SETTING VFR VENT: ABNORMAL %
OXYHGB MFR BLDV: 27 %
PCO2 BLDV: 45 MM HG (ref 40–50)
PH BLDV: 7.43 PH (ref 7.32–7.43)
PO2 BLDV: 16 MM HG (ref 25–47)

## 2019-06-19 PROCEDURE — 83605 ASSAY OF LACTIC ACID: CPT | Performed by: INTERNAL MEDICINE

## 2019-06-19 PROCEDURE — 82805 BLOOD GASES W/O2 SATURATION: CPT | Performed by: INTERNAL MEDICINE

## 2019-06-19 PROCEDURE — 97530 THERAPEUTIC ACTIVITIES: CPT | Mod: GP | Performed by: PHYSICAL THERAPIST

## 2019-06-19 PROCEDURE — 25000128 H RX IP 250 OP 636: Performed by: INTERNAL MEDICINE

## 2019-06-19 PROCEDURE — 25000132 ZZH RX MED GY IP 250 OP 250 PS 637: Performed by: ORTHOPAEDIC SURGERY

## 2019-06-19 PROCEDURE — 25000132 ZZH RX MED GY IP 250 OP 250 PS 637: Performed by: INTERNAL MEDICINE

## 2019-06-19 PROCEDURE — 25000128 H RX IP 250 OP 636: Performed by: ORTHOPAEDIC SURGERY

## 2019-06-19 PROCEDURE — 82947 ASSAY GLUCOSE BLOOD QUANT: CPT | Performed by: ORTHOPAEDIC SURGERY

## 2019-06-19 PROCEDURE — 97162 PT EVAL MOD COMPLEX 30 MIN: CPT | Mod: GP | Performed by: PHYSICAL THERAPIST

## 2019-06-19 PROCEDURE — 99232 SBSQ HOSP IP/OBS MODERATE 35: CPT | Performed by: INTERNAL MEDICINE

## 2019-06-19 PROCEDURE — 12000000 ZZH R&B MED SURG/OB

## 2019-06-19 PROCEDURE — 85018 HEMOGLOBIN: CPT | Performed by: INTERNAL MEDICINE

## 2019-06-19 PROCEDURE — 36415 COLL VENOUS BLD VENIPUNCTURE: CPT | Performed by: INTERNAL MEDICINE

## 2019-06-19 PROCEDURE — 36415 COLL VENOUS BLD VENIPUNCTURE: CPT | Performed by: ORTHOPAEDIC SURGERY

## 2019-06-19 PROCEDURE — 25800030 ZZH RX IP 258 OP 636: Performed by: ORTHOPAEDIC SURGERY

## 2019-06-19 PROCEDURE — 85018 HEMOGLOBIN: CPT | Performed by: ORTHOPAEDIC SURGERY

## 2019-06-19 PROCEDURE — 97110 THERAPEUTIC EXERCISES: CPT | Mod: GP | Performed by: PHYSICAL THERAPIST

## 2019-06-19 RX ORDER — HYDROMORPHONE HYDROCHLORIDE 1 MG/ML
.3-.5 INJECTION, SOLUTION INTRAMUSCULAR; INTRAVENOUS; SUBCUTANEOUS
Status: DISCONTINUED | OUTPATIENT
Start: 2019-06-19 | End: 2019-06-22 | Stop reason: HOSPADM

## 2019-06-19 RX ORDER — HYDROXYZINE HYDROCHLORIDE 25 MG/1
25-50 TABLET, FILM COATED ORAL EVERY 6 HOURS PRN
Qty: 20 TABLET | Refills: 0 | Status: SHIPPED | OUTPATIENT
Start: 2019-06-19 | End: 2019-06-22

## 2019-06-19 RX ORDER — OXYCODONE HYDROCHLORIDE 5 MG/1
5-10 TABLET ORAL EVERY 4 HOURS PRN
Status: DISCONTINUED | OUTPATIENT
Start: 2019-06-19 | End: 2019-06-22 | Stop reason: HOSPADM

## 2019-06-19 RX ORDER — HYDROXYZINE HYDROCHLORIDE 25 MG/1
25-50 TABLET, FILM COATED ORAL EVERY 6 HOURS PRN
Status: DISCONTINUED | OUTPATIENT
Start: 2019-06-19 | End: 2019-06-22 | Stop reason: HOSPADM

## 2019-06-19 RX ORDER — HYDROMORPHONE HYDROCHLORIDE 1 MG/ML
0.3 INJECTION, SOLUTION INTRAMUSCULAR; INTRAVENOUS; SUBCUTANEOUS
Status: COMPLETED | OUTPATIENT
Start: 2019-06-19 | End: 2019-06-19

## 2019-06-19 RX ORDER — HYDROMORPHONE HYDROCHLORIDE 1 MG/ML
.5-1 INJECTION, SOLUTION INTRAMUSCULAR; INTRAVENOUS; SUBCUTANEOUS
Status: DISCONTINUED | OUTPATIENT
Start: 2019-06-19 | End: 2019-06-19

## 2019-06-19 RX ORDER — DULOXETIN HYDROCHLORIDE 60 MG/1
60 CAPSULE, DELAYED RELEASE ORAL DAILY
Status: DISCONTINUED | OUTPATIENT
Start: 2019-06-20 | End: 2019-06-22 | Stop reason: HOSPADM

## 2019-06-19 RX ORDER — ONDANSETRON 4 MG/1
4 TABLET, ORALLY DISINTEGRATING ORAL EVERY 6 HOURS PRN
Qty: 10 TABLET | Refills: 0 | Status: SHIPPED | OUTPATIENT
Start: 2019-06-19

## 2019-06-19 RX ORDER — AMOXICILLIN 250 MG
1 CAPSULE ORAL 2 TIMES DAILY
Qty: 20 TABLET | Refills: 0 | Status: SHIPPED | OUTPATIENT
Start: 2019-06-19

## 2019-06-19 RX ORDER — ACETAMINOPHEN 325 MG/1
650 TABLET ORAL EVERY 4 HOURS PRN
Qty: 30 TABLET | Refills: 0 | Status: SHIPPED | OUTPATIENT
Start: 2019-06-21 | End: 2019-06-30

## 2019-06-19 RX ORDER — OXYCODONE HYDROCHLORIDE 5 MG/1
5-10 TABLET ORAL EVERY 4 HOURS PRN
Qty: 30 TABLET | Refills: 0 | Status: SHIPPED | OUTPATIENT
Start: 2019-06-19 | End: 2019-07-11

## 2019-06-19 RX ADMIN — ACETAMINOPHEN 975 MG: 325 TABLET, FILM COATED ORAL at 05:08

## 2019-06-19 RX ADMIN — BUPROPION HYDROCHLORIDE 450 MG: 150 TABLET, FILM COATED, EXTENDED RELEASE ORAL at 08:24

## 2019-06-19 RX ADMIN — SODIUM CHLORIDE, POTASSIUM CHLORIDE, SODIUM LACTATE AND CALCIUM CHLORIDE: 600; 310; 30; 20 INJECTION, SOLUTION INTRAVENOUS at 01:27

## 2019-06-19 RX ADMIN — CELECOXIB 200 MG: 200 CAPSULE ORAL at 19:27

## 2019-06-19 RX ADMIN — HYDROMORPHONE HYDROCHLORIDE 0.3 MG: 1 INJECTION, SOLUTION INTRAMUSCULAR; INTRAVENOUS; SUBCUTANEOUS at 05:06

## 2019-06-19 RX ADMIN — Medication 1 SPRAY: at 08:30

## 2019-06-19 RX ADMIN — CELECOXIB 200 MG: 200 CAPSULE ORAL at 08:25

## 2019-06-19 RX ADMIN — SODIUM CHLORIDE, POTASSIUM CHLORIDE, SODIUM LACTATE AND CALCIUM CHLORIDE: 600; 310; 30; 20 INJECTION, SOLUTION INTRAVENOUS at 12:52

## 2019-06-19 RX ADMIN — SENNOSIDES AND DOCUSATE SODIUM 2 TABLET: 8.6; 5 TABLET ORAL at 08:25

## 2019-06-19 RX ADMIN — ACETAMINOPHEN 975 MG: 325 TABLET, FILM COATED ORAL at 21:36

## 2019-06-19 RX ADMIN — SODIUM CHLORIDE, POTASSIUM CHLORIDE, SODIUM LACTATE AND CALCIUM CHLORIDE: 600; 310; 30; 20 INJECTION, SOLUTION INTRAVENOUS at 23:32

## 2019-06-19 RX ADMIN — GABAPENTIN 300 MG: 300 CAPSULE ORAL at 08:25

## 2019-06-19 RX ADMIN — DULOXETINE HYDROCHLORIDE 80 MG: 60 CAPSULE, DELAYED RELEASE ORAL at 08:25

## 2019-06-19 RX ADMIN — RIVAROXABAN 10 MG: 10 TABLET, FILM COATED ORAL at 08:25

## 2019-06-19 RX ADMIN — ACETAMINOPHEN 975 MG: 325 TABLET, FILM COATED ORAL at 15:15

## 2019-06-19 RX ADMIN — SODIUM CHLORIDE 1000 ML: 9 INJECTION, SOLUTION INTRAVENOUS at 11:27

## 2019-06-19 RX ADMIN — OXYCODONE HYDROCHLORIDE 10 MG: 5 TABLET ORAL at 08:25

## 2019-06-19 RX ADMIN — SENNOSIDES AND DOCUSATE SODIUM 1 TABLET: 8.6; 5 TABLET ORAL at 19:28

## 2019-06-19 RX ADMIN — CEFAZOLIN SODIUM 1 G: 1 INJECTION, SOLUTION INTRAVENOUS at 01:24

## 2019-06-19 ASSESSMENT — ACTIVITIES OF DAILY LIVING (ADL)
ADLS_ACUITY_SCORE: 15
ADLS_ACUITY_SCORE: 14
ADLS_ACUITY_SCORE: 15

## 2019-06-19 NOTE — PROVIDER NOTIFICATION
REASON FOR CONTACT: BP is 85/37 on right arm; 74\35 on left arm. She's requesting pain medication.  Any orders?   PROVIDER CONTACTED: admitting hospitalist   TIME CONTACTED: now  MODE OF CONTACT: web text  RESPONSE: orders received

## 2019-06-19 NOTE — PLAN OF CARE
PT: PT eval completed. Pt is status post R EVAN due to hip fx. Pt in increased pain and having low BPs, limiting therapy. Pt lives in 1 story home with 5 stairs to enter with 2 adult daughters.  Discharge Planner PT   Patient plan for discharge: none stated  Current status: Pt needing mod/max A x 2 for bed mobility, transferred into standing but became dizzy and BP dropping. Needing to move to allow for improved positioning in bed. RN and MD aware of low pressures, 80/40 in EOB sitting, as low as 74/43 with activity.   Barriers to return to prior living situation: high pain, low BP and limited activity tolerance  Recommendations for discharge: TCU  Rationale for recommendations: Pt currently needing A x 2 for mobility and unable to advance to ambulation at this time. Pt would benefit from continued PT to progress mobility tolerance.        Entered by: Adrienne Argueta 06/19/2019 10:20 AM       PM: pt even lower BP this pm- deffered PT at this time - 72/29. RN informed

## 2019-06-19 NOTE — PLAN OF CARE
Pt very lethargic throughout shift. Oxy only given at 0830 before morning PT. Pt has also been hypotensive, received 1 L bolus. Capno, continuous pulse ox continued. IVF. C/o pain but able to quickly fall back asleep. Passing flatus. Hemovac removed. Ward patent and draining. Able to briefly stand at edge of bed with PT in morning, unable to participate in afternoon PT. 4 L oxymask d/t pt O2 saturation dipping while sleeping. Reg diet. Hypoactive BS. Dressing CDI. Pt has baseline tremors she states d/t large amts of depression medication. Holding off on giving more narcotics d/t sedation. Plan to hopefully participate more in therapy tomorrow, may possibly discharge to TCU when able.

## 2019-06-19 NOTE — PROGRESS NOTES
Notified of blood pressure remaining in the softer side with most recent recheck 85/38.  She is lethargic which he believes is secondary to her pain medications.  Oxygen also low, but no apnea noted.  -Continue capnography and supplemental oxygen  -Need to limit opiates until blood pressure and lethargy improved, changed parameters and spoke with RN  -Continue maintenance IV fluids, does not need bolus at this time

## 2019-06-19 NOTE — PLAN OF CARE
All discharge education completed. AVS signed. Discharge medication education, with side effects completed. Med slips signed. All pts belongings collected and sent home with her. Spouse here at bedside to transport pt home. No questions, comments or concerns. Adequate for discharge.

## 2019-06-19 NOTE — PROGRESS NOTES
06/19/19 0915   Quick Adds   Type of Visit Initial PT Evaluation   Living Environment   Lives With child(ki), adult  (25, 21)   Living Arrangements mobile home   Home Accessibility stairs to enter home   Number of Stairs, Main Entrance 5   Stair Railings, Main Entrance none   Transportation Anticipated car, drives self   Self-Care   Usual Activity Tolerance good   Current Activity Tolerance moderate   Equipment Currently Used at Home none   Activity/Exercise/Self-Care Comment Pt reports chronic back pain prevents her from lifting heavy grocery bags. Pt's dtgs help with this.   Functional Level Prior   Ambulation 0-->independent   Transferring 0-->independent   Toileting 0-->independent   Bathing 0-->independent   Communication 0-->understands/communicates without difficulty   Swallowing 0-->swallows foods/liquids without difficulty   Cognition 0 - no cognition issues reported   Fall history within last six months yes   Number of times patient has fallen within last six months 1   General Information   Onset of Illness/Injury or Date of Surgery - Date 06/18/19   Referring Physician Dr. Zhou   Patient/Family Goals Statement Pt hoping to DC home   Pertinent History of Current Problem (include personal factors and/or comorbidities that impact the POC) Pt is status post R EVAN due to fx.    Precautions/Limitations fall precautions;right hip precautions   Weight-Bearing Status - RLE weight-bearing as tolerated   Cognitive Status Examination   Orientation orientation to person, place and time   Level of Consciousness alert   Follows Commands and Answers Questions 100% of the time;able to follow multistep instructions   Personal Safety and Judgment intact   Memory intact   Pain Assessment   Patient Currently in Pain Yes, see Vital Sign flowsheet   Integumentary/Edema   Integumentary/Edema Comments as expected   Posture    Posture Comments forward flexed   Range of Motion (ROM)   ROM Comment limited 65 degrees of hip  "flexion with heel slides on R side   Strength   Strength Comments 3-/5 with SAQ   Bed Mobility   Bed Mobility Comments Mod A x 2   Transfer Skills   Transfer Comments mod A x 2, limited tolerance to activity due to low BP and high pain, 80/40   Gait   Gait Comments unable to tolerate steps    Modality Interventions   Planned Modality Interventions Cryotherapy   General Therapy Interventions   Planned Therapy Interventions balance training;gait training;strengthening;stretching;transfer training;risk factor education;home program guidelines;progressive activity/exercise   Clinical Impression   Criteria for Skilled Therapeutic Intervention yes, treatment indicated   PT Diagnosis decreased functional mobility status post R EVAN due to fx   Influenced by the following impairments pain, decreased ROM, decreased strength, hip prec   Functional limitations due to impairments decreased transfers, ambulation, stairs   Clinical Presentation Evolving/Changing   Clinical Presentation Rationale high pain, low BP   Clinical Decision Making (Complexity) Moderate complexity   Therapy Frequency 2x/day   Predicted Duration of Therapy Intervention (days/wks) 3 days   Anticipated Discharge Disposition Transitional Care Facility;Home with Assist   Risk & Benefits of therapy have been explained Yes   Patient, Family & other staff in agreement with plan of care Yes   [facility] [employer] \"6 Clicks\"   2016, Trustees of [facility], under license to [employer].  All rights reserved.   6 Clicks Short Forms Basic Mobility Inpatient Short Form   [facility] [employer]  \"6 Clicks\" V.2 Basic Mobility Inpatient Short Form   1. Turning from your back to your side while in a flat bed without using bedrails? 2 - A Lot   2. Moving from lying on your back to sitting on the side of a flat bed without using bedrails? 2 - A Lot   3. Moving to and from a bed to a chair (including a wheelchair)? 1 - Total   4. Standing up from a chair using " your arms (e.g., wheelchair, or bedside chair)? 2 - A Lot   5. To walk in hospital room? 1 - Total   6. Climbing 3-5 steps with a railing? 1 - Total   Basic Mobility Raw Score (Score out of 24.Lower scores equate to lower levels of function) 9   Total Evaluation Time   Total Evaluation Time (Minutes) 10

## 2019-06-19 NOTE — PROGRESS NOTES
[facility]  Hospitalist Progress Note  Julio Cesar Bell MD 06/19/19    Reason for Stay (Diagnosis): Right femoral neck fracture         Assessment and Plan:      Summary of Stay:   Greyson Rogers is a 59 year old female with PMH including MDD, anxiety, HLD, and chronic low back pain who was admitted on 6/17/2019 with following a mechanical fall landing on her right hip resulting in a right femoral neck fracture on pelvis x-ray.  Orthopedics consulted and underwent right EVAN in 6/18.  Having some issues with postoperative pain requiring multimodal therapy and frequent opiates.  Blood pressure lower than baseline likely secondary to opiates.  Some acute blood loss anemia, trending hemoglobin.  PT and OT consulted, likely TCU needed.    Problem List/Assessment and Plan:   Mechanical fall, right femoral neck fracture:  Patient tripped while walking her dog and landed on her right hip.  Unable to ambulate secondary to pain.  No other areas of pain.  Pelvis x-ray showing a impacted right femoral neck fracture.   -Orthopedics consulted, underwent right EVAN 6/18  -Able to get pain under control yesterday evening, but still having a lot of pain with minimal movement.  Continue oral oxycodone 5-10 mg p.o. every 3 hours as needed, acetaminophen, Celebrex, add Flexeril 10 mg daily as needed for back spasms.  Try to cut back on IV Dilaudid use and decrease frequency to every 2 hours, further if able  -Capnography and monitor for any excessive sedation  -PT, OT consult  -Xarelto for DVT prophylaxis per orthopedics     MDD, anxiety: Continue PTA Wellbutrin  mg daily, Cymbalta 80 mg daily, guanfacine 2 mg daily, trazodone 100 mg at bedtime.  Increase Dell of her her PTA Lorazepam to 1 mg p.o. every 4 hours as needed.     HLD, hypertriglyceridemia: Not currently on medications for this.     Chronic low back pain: Degenerative disease on previous x-rays.  Continue PTA gabapentin 300 mg 3 times daily.     Low  "blood pressure: Pressure 102/52 on admission, chronic issue for patient with recent clinic visit blood pressure 90/56.    Blood pressure mostly in the 80s systolic following surgery likely secondary to frequent opiates.  Was only symptomatic when she got up with therapy and blood pressure did drop briefly into the 70s where she became dizzy.  Does have good urine output.  Doubt acute infection.  Did have drop in hemoglobin from 13.4-9.2 following surgery and this will need to be trended.  -Continuing IV fluids for now  Addendum: Blood pressure 70s while lying down.  Check stat hemoglobin with minimal change from previous at 9.0.  Give 1 L normal saline bolus and continue to monitor for now.    Acute blood loss anemia: Hemoglobin 13.4 prior to surgery.  Down to 9.2 postop day 1.  Low blood pressure as above.  Drain in place with sanguinous drainage.  -Repeat hemoglobin this afternoon due to low blood pressure and again in the morning    DVT Prophylaxis: Rivaroxaban per orthopedics  Code Status: Full Code  FEN: Regular diet, LR IV fluids until adequate p.o. intake  Discharge Dispo: Anticipate TCU, PT, OT, social work consult  Estimated Disch Date / # of Days until Disch: Pending improvement in pain control, 1-2 days        Interval History (Subjective):      Pain control little bit better this morning.  Attempted to work with physical therapy was able to stand with moderate assist of 2.  She did have some dizziness on standing and blood pressure did drop to 7443 with activity.  Blood pressures remained lower in the 80s systolic throughout the night.  Urine output is been good.                  Physical Exam:      Last Vital Signs:  BP (!) 86/36 (BP Location: Left arm)   Pulse 58   Temp 98.4  F (36.9  C) (Oral)   Resp 14   Ht 1.753 m (5' 9\")   Wt 70.3 kg (155 lb)   SpO2 95%   BMI 22.89 kg/m      Intake/Output Summary (Last 24 hours) at 6/19/2019 1104  Last data filed at 6/19/2019 1100  Gross per 24 hour   Intake " 1662 ml   Output 2300 ml   Net -638 ml       Constitutional: Awake, NAD  Eyes: sclera white   HEENT:  MMM   Respiratory: No focal crackles or wheeze, is taking more shallow breaths  Cardiovascular: RRR.  No murmur   GI: non-tender, not distended, bowel sounds present  : Ward with yellow urine output  Skin: no rash    Musculoskeletal/extremities: No lower extremity edema  Neurologic: Mild somnolence  Psychiatric: Anxious         Medications:      All current medications were reviewed with changes reflected in problem list.         Data:      All new lab and imaging data was reviewed.   Labs:  Recent Labs   Lab 06/19/19  0700 06/17/19  1648   NA  --  142   POTASSIUM  --  4.6   CHLORIDE  --  109   CO2  --  29   ANIONGAP  --  4   * 127*   BUN  --  22   CR  --  0.97   GFRESTIMATED  --  63   GFRESTBLACK  --  73   ESTHER  --  8.8     Recent Labs   Lab 06/19/19  0700 06/17/19  1648   WBC  --  13.3*   HGB 9.2* 13.4   HCT  --  40.4   MCV  --  87   PLT  --  240      Imaging:   None today      Julio Cesar Bell MD

## 2019-06-19 NOTE — PROGRESS NOTES
Orthopedic Surgery  Greyson Rogers  2019  Admit Date:  2019  POD: 1 Day Post-Op   Procedure(s):  Right  total hip arthroplasty    Sleepy at time of exam   Patient resting in bed.  Staff reports significant pain post surgery yesterday   Pain with motion.   Tolerating oral intake.    Denies nausea or vomiting  Capno in place    Vital Sign Ranges  Temperature Temp  Av.4  F (36.9  C)  Min: 98.1  F (36.7  C)  Max: 98.9  F (37.2  C)   Blood pressure Systolic (24hrs), Av , Min:75 , Max:163        Diastolic (24hrs), Av, Min:32, Max:73      Pulse Pulse  Av  Min: 58  Max: 75   Respirations Resp  Av.6  Min: 8  Max: 41   Pulse oximetry SpO2  Av.5 %  Min: 90 %  Max: 100 %       Dressing is clean, dry, and intact.  Drain present.    Minimal erythema of the surrounding skin.   Bilateral calves are soft, non-tender.  Right lower extremity is NVI.  Sensation intact bilateral lower extremities  Patient able to resist dorsi and plantar flexion bilaterally  +Dp pulse    Labs:  Recent Labs   Lab Test 19  1648   POTASSIUM 4.6     Recent Labs   Lab Test 19  0700 19  1648   HGB 9.2* 13.4     Recent Labs   Lab Test 19  0510 19  1648   INR 1.08 1.04     Recent Labs   Lab Test 19  1648          1. PLAN:   Continue Xarelto when in bed then ASA at discharge for DVT prophylaxis.     Mobilize with PT/OT    WBAT RLE with walker.     Continue current pain regiment.   Dressings: Keep intact.  Change if >60% saturated   Drain to be removed today.  Ok to leave garcia POD 2 if unable to progress  In PT d/t pain   Follow-up: 2 weeks with Dr Zhou    2. Disposition   Anticipate d/c to TCU vs  Home 1-2 days when medically cleared and progressing in PT.    Namrata Hurley PA-C

## 2019-06-19 NOTE — PLAN OF CARE
[facility] Orthopedic Nursing Progress Note       Assessment   Assessment:  Post-operative day #1  Hip hemiarthoplasty (Right)     Pt sedated but awakens readily- falls right back to sleep. C/o right hip pain and requesting pain medication. Accepting of explanation that not safe to  give narcotics with low BP. Pt  falls back to sleep. See flow sheet for BPs. Doctor informed of low BPs.    CMS: is intact. Calves non-tender bilaterally.  Lungs: are clear, encouraged to use incentive spirometer w/a.  BS: active, + flatus   Urine: good output per garcia. Garcia remains in place this am because pt has not dangled  yet-  BP too low.  Surgical Site: Dressing is clean dry intact. Drain output scant.  Pain: controlled with IV pain meds. Ice to incisional area.   Activity: bedrest  Slept well.      Alvino Whitehead RN

## 2019-06-19 NOTE — PLAN OF CARE
Pt A&Ox4 very lethargic and sleepy most of shift. Soft BP other VSS. 3LNC. IV infusing. Pt did not dangle but we repositioned her.  She screamed out during it but settled nicely toward end of shift. Pain managed with scheduled Tylenol, gabapentin, IV Dilaudid. Oxycodone, atarax. Flexeril, and ativan. . Ward patent with 800 ml out. HV 20 ml. Cms intact. Dressing c/d/I. Tremors noted on and off this evening. Capnography WNL. IV ancef. Will continue to monitor.

## 2019-06-19 NOTE — PLAN OF CARE
OT- Orders received, spoke with PT who reports is not ready for OT today due to high pain tolerance, hypotensive and low activity tolerance. At this time, PT is recommending TCU, however would like OT to continue to follow until POC has pino determined. If patient is to discharge home, will initiate OT services, if patient to discharge to TCU, will defer OT to next level of care.

## 2019-06-19 NOTE — PROGRESS NOTES
SWS     D: Received MD order to see regarding discharge planning. Chart reviewed noting PT assessment with recommendation for pt's transfer to rehab facility on discharge. SW attempt to meet with pt today to initiate planning, RN suggested that SW defer visit until tomorrow. SW has left message for daughterRenee informing of SW availability for planning, contact number provided to her as needed.       A/P: SW will meet with pt tomorrow for assessment and initiation of discharge planning in anticipation of transfer to rehab facility.

## 2019-06-19 NOTE — PROVIDER NOTIFICATION
Text paged MD BP 72/29, then 85/38. pt increasingly lethargic, c/o headache. when sleeping 90% on 4L NC, going to change to oxymask. thanks

## 2019-06-19 NOTE — PROGRESS NOTES
"[facility] Progress Note  [facility] 6    Pt is alert and enjoying her breakfast. Pt was pleased to note she had stood up during first PT this morning.   Pt expressed gratitude for cares provided noting \"yesterday was rough\".   Introduced patient to [facility] and availability for support during  hospital stay.     Plan: [facility] remains available for additional emotional/spiritual support.    Jerome Aranda MA  Staff   Pager: [phone]  Phone: [phone]        "

## 2019-06-19 NOTE — PROVIDER NOTIFICATION
REASON FOR CONTACT: BP 84/58 & 83/38. Other vitals good. Urine output good per garcia. Pt sedated but awakens readily.  PROVIDER CONTACTED: admitting hospitalist   TIME CONTACTED: now  MODE OF CONTACT: web text  RESPONSE:

## 2019-06-20 ENCOUNTER — APPOINTMENT (OUTPATIENT)
Dept: CT IMAGING | Facility: CLINIC | Age: 59
End: 2019-06-20
Attending: PHYSICIAN ASSISTANT
Payer: COMMERCIAL

## 2019-06-20 ENCOUNTER — APPOINTMENT (OUTPATIENT)
Dept: PHYSICAL THERAPY | Facility: CLINIC | Age: 59
End: 2019-06-20
Payer: COMMERCIAL

## 2019-06-20 LAB
ANION GAP SERPL CALCULATED.3IONS-SCNC: 2 MMOL/L (ref 3–14)
BUN SERPL-MCNC: 12 MG/DL (ref 7–30)
CALCIUM SERPL-MCNC: 7.7 MG/DL (ref 8.5–10.1)
CHLORIDE SERPL-SCNC: 109 MMOL/L (ref 94–109)
CO2 SERPL-SCNC: 28 MMOL/L (ref 20–32)
CREAT SERPL-MCNC: 0.81 MG/DL (ref 0.52–1.04)
ERYTHROCYTE [DISTWIDTH] IN BLOOD BY AUTOMATED COUNT: 12.4 % (ref 10–15)
GFR SERPL CREATININE-BSD FRML MDRD: 79 ML/MIN/{1.73_M2}
GLUCOSE SERPL-MCNC: 105 MG/DL (ref 70–99)
HCT VFR BLD AUTO: 27.4 % (ref 35–47)
HGB BLD-MCNC: 8.8 G/DL (ref 11.7–15.7)
MCH RBC QN AUTO: 28.9 PG (ref 26.5–33)
MCHC RBC AUTO-ENTMCNC: 32.1 G/DL (ref 31.5–36.5)
MCV RBC AUTO: 90 FL (ref 78–100)
PLATELET # BLD AUTO: 136 10E9/L (ref 150–450)
POTASSIUM SERPL-SCNC: 4.3 MMOL/L (ref 3.4–5.3)
RBC # BLD AUTO: 3.05 10E12/L (ref 3.8–5.2)
SODIUM SERPL-SCNC: 139 MMOL/L (ref 133–144)
WBC # BLD AUTO: 9.1 10E9/L (ref 4–11)

## 2019-06-20 PROCEDURE — 72192 CT PELVIS W/O DYE: CPT

## 2019-06-20 PROCEDURE — 36415 COLL VENOUS BLD VENIPUNCTURE: CPT | Performed by: INTERNAL MEDICINE

## 2019-06-20 PROCEDURE — 85027 COMPLETE CBC AUTOMATED: CPT | Performed by: INTERNAL MEDICINE

## 2019-06-20 PROCEDURE — 97530 THERAPEUTIC ACTIVITIES: CPT | Mod: GP | Performed by: PHYSICAL THERAPIST

## 2019-06-20 PROCEDURE — 25000132 ZZH RX MED GY IP 250 OP 250 PS 637: Performed by: INTERNAL MEDICINE

## 2019-06-20 PROCEDURE — 25000132 ZZH RX MED GY IP 250 OP 250 PS 637: Performed by: ORTHOPAEDIC SURGERY

## 2019-06-20 PROCEDURE — 99232 SBSQ HOSP IP/OBS MODERATE 35: CPT | Performed by: INTERNAL MEDICINE

## 2019-06-20 PROCEDURE — 25800030 ZZH RX IP 258 OP 636: Performed by: ORTHOPAEDIC SURGERY

## 2019-06-20 PROCEDURE — 12000000 ZZH R&B MED SURG/OB

## 2019-06-20 PROCEDURE — 80048 BASIC METABOLIC PNL TOTAL CA: CPT | Performed by: INTERNAL MEDICINE

## 2019-06-20 PROCEDURE — 40000894 ZZH STATISTIC OT IP EVAL DEFER: Performed by: OCCUPATIONAL THERAPIST

## 2019-06-20 RX ADMIN — ACETAMINOPHEN 975 MG: 325 TABLET, FILM COATED ORAL at 05:33

## 2019-06-20 RX ADMIN — RIVAROXABAN 10 MG: 10 TABLET, FILM COATED ORAL at 08:56

## 2019-06-20 RX ADMIN — SODIUM CHLORIDE, POTASSIUM CHLORIDE, SODIUM LACTATE AND CALCIUM CHLORIDE: 600; 310; 30; 20 INJECTION, SOLUTION INTRAVENOUS at 17:07

## 2019-06-20 RX ADMIN — SENNOSIDES AND DOCUSATE SODIUM 1 TABLET: 8.6; 5 TABLET ORAL at 20:21

## 2019-06-20 RX ADMIN — OXYCODONE HYDROCHLORIDE 5 MG: 5 TABLET ORAL at 15:59

## 2019-06-20 RX ADMIN — ACETAMINOPHEN 975 MG: 325 TABLET, FILM COATED ORAL at 15:21

## 2019-06-20 RX ADMIN — LORAZEPAM 1 MG: 1 TABLET ORAL at 11:21

## 2019-06-20 RX ADMIN — SENNOSIDES AND DOCUSATE SODIUM 2 TABLET: 8.6; 5 TABLET ORAL at 08:58

## 2019-06-20 RX ADMIN — OXYCODONE HYDROCHLORIDE 5 MG: 5 TABLET ORAL at 08:56

## 2019-06-20 RX ADMIN — OXYCODONE HYDROCHLORIDE 5 MG: 5 TABLET ORAL at 15:20

## 2019-06-20 RX ADMIN — CYCLOBENZAPRINE HYDROCHLORIDE 10 MG: 10 TABLET, FILM COATED ORAL at 08:56

## 2019-06-20 RX ADMIN — CELECOXIB 200 MG: 200 CAPSULE ORAL at 08:56

## 2019-06-20 RX ADMIN — DULOXETINE HYDROCHLORIDE 60 MG: 60 CAPSULE, DELAYED RELEASE ORAL at 08:56

## 2019-06-20 RX ADMIN — TRAZODONE HYDROCHLORIDE 100 MG: 50 TABLET ORAL at 22:25

## 2019-06-20 RX ADMIN — SODIUM CHLORIDE, POTASSIUM CHLORIDE, SODIUM LACTATE AND CALCIUM CHLORIDE: 600; 310; 30; 20 INJECTION, SOLUTION INTRAVENOUS at 07:32

## 2019-06-20 RX ADMIN — GABAPENTIN 300 MG: 300 CAPSULE ORAL at 20:21

## 2019-06-20 RX ADMIN — GABAPENTIN 300 MG: 300 CAPSULE ORAL at 08:56

## 2019-06-20 RX ADMIN — SODIUM CHLORIDE, POTASSIUM CHLORIDE, SODIUM LACTATE AND CALCIUM CHLORIDE: 600; 310; 30; 20 INJECTION, SOLUTION INTRAVENOUS at 23:39

## 2019-06-20 RX ADMIN — GABAPENTIN 300 MG: 300 CAPSULE ORAL at 15:20

## 2019-06-20 RX ADMIN — ACETAMINOPHEN 975 MG: 325 TABLET, FILM COATED ORAL at 22:25

## 2019-06-20 RX ADMIN — BUPROPION HYDROCHLORIDE 450 MG: 150 TABLET, FILM COATED, EXTENDED RELEASE ORAL at 08:56

## 2019-06-20 ASSESSMENT — ACTIVITIES OF DAILY LIVING (ADL)
ADLS_ACUITY_SCORE: 19
ADLS_ACUITY_SCORE: 15

## 2019-06-20 NOTE — PLAN OF CARE
Discharge Planner OT   Patient plan for discharge: TCU  Current status: order received and chart reviewed.  Per PT and chart, discharge recommendation is for TCU. Patient is MaxA of 2-3 for transfers.  Barriers to return to prior living situation: defer to PT  Recommendations for discharge: defer to PT  Rationale for recommendations: discharge plan is for TCU.  Will defer skilled OT evaluation to next level of care.  Will complete orders.       Entered by: NICK DELGADO 06/20/2019 3:56 PM

## 2019-06-20 NOTE — PROVIDER NOTIFICATION
Spoke with Dr. Rico regarding VBG results.  No changes at this time.  Do not give any additional pain meds at this time, just keep on scheduled and try heating pad to help with spasms.  Will continue to monitor closely.

## 2019-06-20 NOTE — PLAN OF CARE
Discharge Planner PT   Patient plan for discharge: none stated  Current status: Significant time required for session:75min. Nursing acting as 2nd and 3rd assist throughout. Pt supine upon initiation, agreeable to trial mobility. Pt completes supine>sit. For first 50% of transfer pt able to slowly work self toward EOB and able to get both LE over EOB. Pt then requires maxAx2 to sit up, and requires continued assist at back to maintain sitting secondary to pain. Pt requires significant time in sitting to accommodate to pain, and cues for breathing. Vitals assessed and WNL-see flowsheet. Pt attempts sit<>stand with sarasteady x 3 reps with maxAx2. Pt requires significant time between reps 2/2 pain and shallow breathing 2/2 anxiety. At one point pt reports significant dizziness, but with cuing from RN is able to slow breathing and eliminate dizziness. Pt completes sit<>stand with maxAx3 and sarasteady. Pt calling out in pain during transfer. Pt transferred to bedside commode. Able to complete sit<>stand with maxAx2, with Ax1 for pericares. Pt transfers to bedside chair with use of dennys steady. Requires maxAx2 to boost back in chair. Pt with nursing at end of session. Pt's vitals assessed multiple times throughout session-see vitals flowsheet. Pt calling out in pain frequently, but willing to participate throughout session. Pt requires significant encouragement and relaxation techniques throughout session.   Barriers to return to prior living situation: high pain, low BP and limited activity tolerance  Recommendations for discharge: TCU  Rationale for recommendations: Pt is not currently at baseline for mobility, and is unsafe to discharge home. With continued PT, both IP and after discharge, pt is more likely to obtain mobility goals.        Entered by: Deanna Cueva 06/20/2019 12:56 PM

## 2019-06-20 NOTE — CONSULTS
.Care Transition Initial Assessment - SW  Discharge planning: Chart reviewed noting ortho PA documentation with discharge planned ( home vs TCU) when medically cleared. Noted per hospitalist documentation from today the possibility of pt's discharge tomorrow providing adequate pain control  Met with: Patient    Active Problems:    Closed displaced fracture of right femoral neck (H)    Femoral neck fracture (H)       DATA  Lives With: child(ki), adult(25, 21)   Living Arrangements: mobile home  Quality of Family Relationships: involved, supportive  Description of Support System: Supportive, Involved  Who is your support system?: Children    Resources List: Skilled Nursing Facility     Quality of Family Relationships: involved, supportive  Transportation Anticipated: (to be determined)    INTERVENTION    Met with pt and discussed recommendation for her transfer to rehab facility on discharge which she is in agreement with. SNF listing has been provided, she has asked to have facilities considered close to her home ( [place]), she notes of her awareness of [facility] ( her parents live in the community setting there) and therefore referral sent to [facility], with referral also sent to [facility]. Pt has requested a semi-private room.       SW offered to contact daughter to update, pt indicated she will discuss with daughter. SW contact number was provided should they identify other facilities for consideration.       Medical transport anticipated, will discuss further with pt.     ASSESSMENT  Cognitive Status:  alert and oriented     PLAN    Patient given options and choices for discharge: Yes  Patient/family is agreeable to the plan?  Yes  Patient Goals and Preferences: independence with ambulation and ADLs  Patient anticipates discharging to:  Rehab facility         Will await facility assessments, continue planning per availability and MD determination of discharge date.     Addendum:     D:  [facility] has assessed and would be able to accept pt, bed available tomorrow. This is a private room however they will waive the private room fee. Per discussion with MD planning will continue for possible discharge tomorrow, SW will adjust arrangements as needed.         Met with pt and discussed above, she has asked that planning continue for her transfer to [facility], medical transport requested... pt indicates that she would be able to tolerate w/c transport. Discussed with pt that the transport will be billed to her [facility] but that SW would be unable to guarantee coverage, cost of $75/base and $5/mi if not covered which she acknowledged and accepted. [facility], w/c transport arranged for 1400. SW aware that pt is currently on o2, if needed for transport will arrange for portability.      Following.. Will await further MD determination of pt's readiness for discharge tomorrow, adjust arrangements accordingly if pt is not ready for discharge tomorrow.

## 2019-06-20 NOTE — PLAN OF CARE
Soft BPs but improvement from yesterday 6/19. NC 3 L. Continuous pulse ox. Scant drainage on drsg. Pt lethargic at times but always arousable. Started shift by giving 5mg oxy with flexeril, retook bp which was good, gave ativan.Heavy A3 for morning PT to dennys steady that took over an hour, pt was 10/10 pain, hyperventilating, needs reminder to relax face and hands, ativan given for anxiety. Pt had BM on commode before going to recliner in  morning. Pt in recliner for 3 hours, tolerated well. Spoke to Namrata BOYLE, received order for pelvic CT and garcia continuation. Medicated pt before sending her down for CT, 5mg oxy-retook bp 30 mins, than 5 more mg oxy. Recommended to not give 10mg at once, need to frequently check bp's. When getting up to dennys steady to go to cart for CT after being medicated pt did much better. Garcia patent and draining. Pt needs encouragement to do deep breathing and coughing, incentive spirometer. IVF. Reg diet. No N/V. CMS intact. Plan to discharge to TCU when able.

## 2019-06-20 NOTE — PROGRESS NOTES
Orthopedic Surgery  Greyson Rogers  2019  Admit Date:  2019  POD: 2 Days Post-Op   Procedure(s):  Right  total hip arthroplasty    Alert and orient to person, place, and time.  Patient resting comfortably in bed.    Pain and spasms continued right hip  Tolerating oral intake.    Denies nausea or vomiting  Denies chest pain or shortness of breath  Bps soft:  Limiting narcotics overnight    Vital Sign Ranges  Temperature Temp  Av.2  F (36.2  C)  Min: 93.8  F (34.3  C)  Max: 99.2  F (37.3  C)   Blood pressure Systolic (24hrs), Av , Min:74 , Max:122        Diastolic (24hrs), Av, Min:33, Max:59      Pulse Pulse  Av.2  Min: 55  Max: 69   Respirations Resp  Av.6  Min: 13  Max: 29   Pulse oximetry SpO2  Av.4 %  Min: 91 %  Max: 99 %       Dressing is clean, dry, and intact.   Minimal erythema of the surrounding skin.   No palpable muscle spams present.   Bilateral calves are soft, non-tender.  Right lower extremity is NVI.  Sensation intact bilateral lower extremities  Patient able to resist dorsi and plantar flexion bilaterally  +Dp pulse    Labs:  Recent Labs   Lab Test 19  0638 19  1648   POTASSIUM 4.3 4.6     Recent Labs   Lab Test 19  0638 19  1129 19  0700   HGB 8.8* 9.0* 9.2*     Recent Labs   Lab Test 19  0510 19  1648   INR 1.08 1.04     Recent Labs   Lab Test 19  0638 19  1648   * 240       1. PLAN:   Continue Xarelto when in hospital then ASA at discharge for DVT prophylaxis.     Will remove garcia today if patient able to get out of bed.     Mobilize with PT/OT    WBAT RLE with walker.     Continue current pain regiment as able pending her BPs   Dressings: Keep intact.  Change if >60% saturated   Follow-up: 2 weeks Dr Zhou    2. Disposition   Anticipate d/c to TCU vs home when medically cleared and progressing in PT.    Namrata Hurley PA-C

## 2019-06-20 NOTE — PROGRESS NOTES
X-cover    Called for leg cramps and patient screaming out in pain.     Remains lethargic, setting off capnography, and becomes hypoxic when she holds her breath due to pain causing increase in supplemental O2.    Check VBG    VB.43/45/16    No evidence of Co2 retention  Her O2 requirements have actually decreased-not sure why they have measured so low on the VBG.    BP is little better, continue to monitor closely

## 2019-06-20 NOTE — PLAN OF CARE
A&o X4. Soft BPs, SBP 80s-90s, DBP 30s. MD aware. LR running at 125mL/hr. O2 stable on 4 LPM of O2. Capno in place. Dressing to R hip is CDI, wiggles toes, spasms to RLE, CMS intact. Unable to take pain medications due to low BPs. kristi regular diet well. Bedrest for pain. Oral tylenol and ice for pain at this time. voiding in good amts via garcia catheter. Will continue to monitor.

## 2019-06-20 NOTE — PROGRESS NOTES
[facility]  Hospitalist Progress Note  Julio Cesar Bell MD 06/20/19    Reason for Stay (Diagnosis): Right femoral neck fracture         Assessment and Plan:      Summary of Stay:   Greyson Rogers is a 59 year old female with PMH including MDD, anxiety, HLD, and chronic low back pain who was admitted on 6/17/2019 with following a mechanical fall landing on her right hip resulting in a right femoral neck fracture on pelvis x-ray.  Orthopedics consulted and underwent right EVAN in 6/18.  Having some issues with postoperative pain requiring multimodal therapy and frequent opiates.  Blood pressure lower than baseline likely secondary to opiates.  Some acute blood loss anemia, but this appears stable.  PT and OT recommending TCU.  Requiring some oxygen, likely secondary to atelectasis and shallow inspiration due to pain.  Had to decrease dosing and frequency of opiates due to excessive sedation.  Continue to work on pain control today while monitoring for excessive sedation and wean oxygen as able.    Problem List/Assessment and Plan:   Mechanical fall, right femoral neck fracture:  Patient tripped while walking her dog and landed on her right hip.  Unable to ambulate secondary to pain.  No other areas of pain.  Pelvis x-ray showing a impacted right femoral neck fracture.   -Orthopedics consulted, underwent right EVAN 6/18  -Increased pain specifically spasm in the right hip with holding opiates due to sedation and hypotension overnight.  -Give dose of Flexeril this morning and 5 mg oxycodone, if not lethargic and not hypotensive okay to increase dose of oxycodone 10 mg and monitor for further sedation.  -Capnography and monitor for any excessive sedation  -PT, OT consult  -Xarelto for DVT prophylaxis per orthopedics  -Wean oxygen off as able     MDD, anxiety: Continue PTA Wellbutrin  mg daily, Cymbalta 80 mg daily, guanfacine 2 mg daily, trazodone 100 mg at bedtime.  Increase the frequency of her her PTA  "Lorazepam to 1 mg p.o. every 4 hours as needed.     HLD, hypertriglyceridemia: Not currently on medications for this.     Chronic low back pain: Degenerative disease on previous x-rays.  Continue PTA gabapentin 300 mg 3 times daily.     Low blood pressure: Pressure 102/52 on admission, chronic issue for patient with recent clinic visit blood pressure 90/56.  Variable blood pressure but at times can drop to the 70s when up and will become dizzy with that.  Hemoglobin dropped from baseline, although does not appear to be actively bleeding.  Lactic acid when checked was normal suggesting she is perfusing.  Does have good urine output.  Doubt acute infection.  Did have drop in hemoglobin from 13.4-9.2 following surgery and this will need to be trended.  -Her blood pressure when adding back opiates today for pain control  -Continuing IV fluids for now until good p.o. intake    Acute blood loss anemia: Hemoglobin 13.4 prior to surgery.  Down to 8.8 postop day 2.  Low blood pressure as above.  Appears relatively stable.    DVT Prophylaxis: Rivaroxaban per orthopedics  Code Status: Full Code  FEN: Regular diet, LR IV fluids until adequate p.o. intake  Discharge Dispo: TCU, PT, OT, social work consult  Estimated Disch Date / # of Days until Disch: Pending improvement in pain control hopefully will be ready by tomorrow        Interval History (Subjective):      More alert since holding opiates overnight, but is in increased pain and having spasms of the right hip.  Oxygen high 90s on 3 L of oxygen.  Denies any shortness of breath.                  Physical Exam:      Last Vital Signs:  /59 (BP Location: Left arm)   Pulse 59   Temp 93.8  F (34.3  C) (Axillary)   Resp 20   Ht 1.753 m (5' 9\")   Wt 70.3 kg (155 lb)   SpO2 99%   BMI 22.89 kg/m    Intake/Output Summary (Last 24 hours) at 6/20/2019 1057  Last data filed at 6/20/2019 1000  Gross per 24 hour   Intake 1739 ml   Output 1555 ml   Net 184 ml "     Constitutional: Awake, NAD  Eyes: sclera white   HEENT:  MMM   Respiratory: No focal crackles or wheeze   Cardiovascular: RRR.  No murmur   GI: non-tender, not distended, bowel sounds present  Skin: no rash    Musculoskeletal/extremities: Trace right lower extremity edema  Neurologic: Alert and oriented  Psychiatric: Anxious         Medications:      All current medications were reviewed with changes reflected in problem list.         Data:      All new lab and imaging data was reviewed.   Labs:  Recent Labs   Lab 06/20/19  0638 06/19/19  0700 06/17/19  1648     --  142   POTASSIUM 4.3  --  4.6   CHLORIDE 109  --  109   CO2 28  --  29   ANIONGAP 2*  --  4   * 116* 127*   BUN 12  --  22   CR 0.81  --  0.97   GFRESTIMATED 79  --  63   GFRESTBLACK >90  --  73   ESTHER 7.7*  --  8.8     Recent Labs   Lab 06/20/19  0638 06/19/19  1129 06/19/19  0700 06/17/19  1648   WBC 9.1  --   --  13.3*   HGB 8.8* 9.0* 9.2* 13.4   HCT 27.4*  --   --  40.4   MCV 90  --   --  87   *  --   --  240      Imaging:   None today      Julio Cesar Bell MD

## 2019-06-20 NOTE — PROGRESS NOTES
Your information has been submitted on June 20th, 2019 at 02:35:46 PM CDT. The confirmation number is [id number]

## 2019-06-20 NOTE — PLAN OF CARE
"  Vital signs:  Temp: 99.2  F (37.3  C) Temp src: Oral BP: 94/43 Pulse: 59 Heart Rate: 66 Resp: 29 SpO2: 96 % O2 Device: Oxymask Oxygen Delivery: 3 LPM Height: 175.3 cm (5' 9\") Weight: 70.3 kg (155 lb)  Estimated body mass index is 22.89 kg/m  as calculated from the following:    Height as of this encounter: 1.753 m (5' 9\").    Weight as of this encounter: 70.3 kg (155 lb).    Pt A&O x4. Still in an abundance amount of pain is movement. Holding narcotics due low  BP and lethargy. Scheduled tylenol  for pain. Capno and continuous pulse ox to be continued. O2 weaned down to 3 LPM. LS clear.  BS hypoactive. Tolerating a regular diet. Ward catheter patent and draining. Scant amount of drainage to dressing. Will continue supportive care and  continue to monitor.  "

## 2019-06-21 ENCOUNTER — APPOINTMENT (OUTPATIENT)
Dept: PHYSICAL THERAPY | Facility: CLINIC | Age: 59
End: 2019-06-21
Payer: COMMERCIAL

## 2019-06-21 LAB — HGB BLD-MCNC: 8.9 G/DL (ref 11.7–15.7)

## 2019-06-21 PROCEDURE — 25000132 ZZH RX MED GY IP 250 OP 250 PS 637: Performed by: INTERNAL MEDICINE

## 2019-06-21 PROCEDURE — 97530 THERAPEUTIC ACTIVITIES: CPT | Mod: GP | Performed by: PHYSICAL THERAPIST

## 2019-06-21 PROCEDURE — 25000132 ZZH RX MED GY IP 250 OP 250 PS 637: Performed by: ORTHOPAEDIC SURGERY

## 2019-06-21 PROCEDURE — 85018 HEMOGLOBIN: CPT | Performed by: INTERNAL MEDICINE

## 2019-06-21 PROCEDURE — 36415 COLL VENOUS BLD VENIPUNCTURE: CPT | Performed by: INTERNAL MEDICINE

## 2019-06-21 PROCEDURE — 12000000 ZZH R&B MED SURG/OB

## 2019-06-21 PROCEDURE — 99232 SBSQ HOSP IP/OBS MODERATE 35: CPT | Performed by: INTERNAL MEDICINE

## 2019-06-21 RX ADMIN — CYCLOBENZAPRINE HYDROCHLORIDE 10 MG: 10 TABLET, FILM COATED ORAL at 17:58

## 2019-06-21 RX ADMIN — ACETAMINOPHEN 650 MG: 325 TABLET, FILM COATED ORAL at 12:11

## 2019-06-21 RX ADMIN — GABAPENTIN 300 MG: 300 CAPSULE ORAL at 20:35

## 2019-06-21 RX ADMIN — OXYCODONE HYDROCHLORIDE 5 MG: 5 TABLET ORAL at 10:07

## 2019-06-21 RX ADMIN — OXYCODONE HYDROCHLORIDE 5 MG: 5 TABLET ORAL at 16:31

## 2019-06-21 RX ADMIN — SENNOSIDES AND DOCUSATE SODIUM 2 TABLET: 8.6; 5 TABLET ORAL at 09:13

## 2019-06-21 RX ADMIN — TRAZODONE HYDROCHLORIDE 100 MG: 50 TABLET ORAL at 22:10

## 2019-06-21 RX ADMIN — GUANFACINE 2 MG: 1 TABLET ORAL at 22:09

## 2019-06-21 RX ADMIN — DULOXETINE HYDROCHLORIDE 60 MG: 60 CAPSULE, DELAYED RELEASE ORAL at 09:13

## 2019-06-21 RX ADMIN — OXYCODONE HYDROCHLORIDE 5 MG: 5 TABLET ORAL at 20:35

## 2019-06-21 RX ADMIN — ACETAMINOPHEN 650 MG: 325 TABLET, FILM COATED ORAL at 17:58

## 2019-06-21 RX ADMIN — CYCLOBENZAPRINE HYDROCHLORIDE 10 MG: 10 TABLET, FILM COATED ORAL at 06:38

## 2019-06-21 RX ADMIN — OXYCODONE HYDROCHLORIDE 5 MG: 5 TABLET ORAL at 12:02

## 2019-06-21 RX ADMIN — BUPROPION HYDROCHLORIDE 450 MG: 150 TABLET, FILM COATED, EXTENDED RELEASE ORAL at 09:13

## 2019-06-21 RX ADMIN — ACETAMINOPHEN 975 MG: 325 TABLET, FILM COATED ORAL at 06:08

## 2019-06-21 RX ADMIN — GABAPENTIN 300 MG: 300 CAPSULE ORAL at 09:14

## 2019-06-21 RX ADMIN — GABAPENTIN 300 MG: 300 CAPSULE ORAL at 14:12

## 2019-06-21 RX ADMIN — OXYCODONE HYDROCHLORIDE 5 MG: 5 TABLET ORAL at 06:08

## 2019-06-21 RX ADMIN — RIVAROXABAN 10 MG: 10 TABLET, FILM COATED ORAL at 09:13

## 2019-06-21 ASSESSMENT — ACTIVITIES OF DAILY LIVING (ADL)
ADLS_ACUITY_SCORE: 13
ADLS_ACUITY_SCORE: 18
ADLS_ACUITY_SCORE: 18
ADLS_ACUITY_SCORE: 13
ADLS_ACUITY_SCORE: 13
ADLS_ACUITY_SCORE: 18

## 2019-06-21 NOTE — PROGRESS NOTES
Discharge Planner   Discharge Plans in progress: discharge orders faxed to [facility] for later today   Barriers to discharge plan: MD wanted to verify pt was able to void. Transport changed to later time.   Follow up plan: orders completed and sent.    Addendum  Paged by 6th floor that tp will not d.c till tomorrow  Called TCU to update 1430         Entered by: Corinne C. White 06/21/2019 1:28 PM

## 2019-06-21 NOTE — PLAN OF CARE
A&O x4. BP soft but stable 100s/40s. All other VSS. LS CTA all fields. BS active x4. Dressing to R hip has scant amt of drainage, CMS intact. kristi regular diet well. up with A2 and sarasteady.  PO tylenol and oxycodone 5mg  managing pain this shift. voiding in good amts via garcia. TCU at discharge, bed available at [facility] today, will continue to monitor.

## 2019-06-21 NOTE — PLAN OF CARE
Patient anxious and tremulous with any kind of activity.  CMS intact.  Small amount drainage on upper portion of aquacel.  Tolerating diet.  VSS.  BP low 100's/50's.  Refusing therapy this am.  Unable to void all shift.  Scanned for 507.  Max 3 assist to get patient to [facility].  Voided small amount unmeasured.  Straight cath'd for 600.  Unable to wean O2.  Sats drop to mid 80's on room air.  Maintains 91-94% on 2L O2.  Plan for TCU tomorrow.

## 2019-06-21 NOTE — PROGRESS NOTES
Orthopedic Surgery  Greyson Rogers  2019  Admit Date:  2019  POD: 3 Days Post-Op   Procedure(s):  Right  total hip arthroplasty    Alert and orient to person, place, and time.  Patient resting comfortably in bed.    Continues to complain of pain and spasm.  Tolerating oral intake.    Denies nausea or vomiting  Denies chest pain or shortness of breath  Pelvis CT ordered yesterday d/t continued pain.  No fractures seen.    Vital Sign Ranges  Temperature Temp  Av.4  F (36.9  C)  Min: 97.9  F (36.6  C)  Max: 99  F (37.2  C)   Blood pressure Systolic (24hrs), Av , Min:82 , Max:122        Diastolic (24hrs), Av, Min:30, Max:69      Pulse Pulse  Av  Min: 67  Max: 72   Respirations Resp  Av.5  Min: 16  Max: 20   Pulse oximetry SpO2  Av.2 %  Min: 87 %  Max: 100 %       Dressing is clean, dry, and intact.   Minimal erythema of the surrounding skin.   Bilateral calves are soft, non-tender.  Right lower extremity is NVI.  Sensation intact bilateral lower extremities  Patient able to resist dorsi and plantar flexion bilaterally  +Dp pulse    Labs:  Recent Labs   Lab Test 19  0638 19  1648   POTASSIUM 4.3 4.6     Recent Labs   Lab Test 19  0656 19  0638 19  1129   HGB 8.9* 8.8* 9.0*     Recent Labs   Lab Test 19  0510 19  1648   INR 1.08 1.04     Recent Labs   Lab Test 19  0638 19  1648   * 240     Pelvis CT FINDINGS: There are surgical changes of a right total hip  arthroplasty. The hardware is intact with no fracture or evidence of  loosening. The osseous structures are otherwise unremarkable. There  are degenerative changes in the visualized lower lumbar spine.  Surgical staples are seen lateral to the right hip. No other soft  tissue pathology is noted.    1. PLAN:   Continue Xarelto in hospital the ASA at discharge for DVT prophylaxis.     Mobilize with PT/OT    WBAT with walker .     Continue current pain regiment.   Dressings:  Keep intact.  Change if >60% saturated   Remove Ward today   Follow-up: 2 weeks Dr Zhou    2. Disposition   Anticipate d/c to TCU when medically cleared and progressing in PT.  Ok per ortho.     Namrata Hurley PA-C

## 2019-06-21 NOTE — DISCHARGE SUMMARY
[facility]  Discharge Summary  Hospitalist      Date of Admission:  6/17/2019  Date of Discharge:  6/22/2019  Provider:  Yovani Nation MD. [facility]  Date of Service (when I last saw the patient): 06/21/19      Primary Provider: Shakira Edwards          Discharge Diagnosis:     Discharge Diagnoses   Mechanical fall resulting in right femoral neck fracture  Major depression and anxiety disorder  Chronic low back pain  Acute blood loss anemia    Other medical issues:  Past Medical History:   Diagnosis Date     Depressive disorder          Please see the admission history and physical for full details.     Hospital Course     Greyson Rogers was admitted on 6/17/2019.  The following problems were addressed during her hospitalization:  59 year old female with PMH including MDD, anxiety, HLD, and chronic low back pain who was admitted on 6/17/2019 with following a mechanical fall landing on her right hip resulting in a right femoral neck fracture on pelvis x-ray.  Orthopedics consulted and underwent right EVAN in 6/18.  Having some issues with postoperative pain requiring multimodal therapy and frequent opiates.  Blood pressure lower than baseline likely secondary to opiates.  Some acute blood loss anemia, but this appears stable.  PT and OT recommending TCU.  Requiring some oxygen, likely secondary to atelectasis and shallow inspiration due to pain.  Had to decrease dosing and frequency of opiates due to excessive sedation.  Continue to work on pain control today while monitoring for excessive sedation      Problem List/Assessment and Plan:   Mechanical fall, right femoral neck fracture:  Patient tripped while walking her dog and landed on her right hip.  Unable to ambulate secondary to pain.  No other areas of pain.  Pelvis x-ray showing a impacted right femoral neck fracture.   -Orthopedics consulted, underwent right EVAN 6/18  -Increased pain specifically spasm in the right hip with holding opiates due to  sedation and hypotension overnight postop.  This had completely resolved by the time of discharge  -PT, OT consulted during her stay  -Xarelto for DVT prophylaxis per orthopedics during her hospital stay and aspirin on discharge      MDD, anxiety: Continue PTA Wellbutrin  mg daily, Cymbalta 80 mg daily, guanfacine 2 mg daily, trazodone 100 mg at bedtime.    She is on lorazepam chronically at home       HLD, hypertriglyceridemia: Not currently on medications for this.     Chronic low back pain: Degenerative disease on previous x-rays.  Continue PTA gabapentin 300 mg 3 times daily.        Acute blood loss anemia: Hemoglobin 13.4 prior to surgery.  Down to 8.8 postop day 2.  8.9 postop day 3.  No evidence of active bleeding, likely related to hip fracture, surgery,  dilutional    Pending Results   Unresulted Labs Ordered in the Past 30 Days of this Admission     No orders found from 4/18/2019 to 6/18/2019.          Discharge Orders      General info for SNF    Length of Stay Estimate: Short Term Care: Estimated # of Days <30  Condition at Discharge: Improving  Level of care:skilled   Rehabilitation Potential: Good  Admission H&P remains valid and up-to-date: Yes  Recent Chemotherapy: N/A  Use Nursing Home Standing Orders: Yes     Mantoux instructions    Give two-step Mantoux (PPD) Per Facility Policy Yes     Reason for your hospital stay    Right hip fracture:  EVAN (yariel/lat approach)     Wound care (specify)    Site:   Right hip  Instructions:  Keep dressings intact, change if saturated or peeling off.     Follow Up and recommended labs and tests    Follow up with Dr. Zhou 2 weeks post surgery.   Call [phone] for appointment and questions.     Activity - Up with assistive device    Up with walker     Weight bearing status    WBAT RLE     Physical Therapy Adult Consult    Evaluate and treat as clinically indicated.    Reason:  Right hip fracture:  EVAN     Occupational Therapy Adult Consult    Evaluate  and treat as clinically indicated.    Reason:  Right hip fracture:  EVAN     Fall precautions     Diet    Follow this diet upon discharge: Orders Placed This Encounter      Advance Diet as Tolerated: Regular Diet Adult       Code Status   Full Code       Primary Care Physician   Shakira Edwards    Physical Exam   Temp: 99.3  F (37.4  C) Temp src: Oral BP: 149/66 Pulse: 67 Heart Rate: 80 Resp: 16 SpO2: 94 % O2 Device: Nasal cannula Oxygen Delivery: 2 LPM  Vitals:    06/17/19 1509   Weight: 70.3 kg (155 lb)     Vital Signs with Ranges  Temp:  [97.9  F (36.6  C)-99.3  F (37.4  C)] 99.3  F (37.4  C)  Pulse:  [67-72] 67  Heart Rate:  [67-80] 80  Resp:  [16-20] 16  BP: ()/(30-69) 149/66  SpO2:  [94 %-100 %] 94 %  I/O last 3 completed shifts:  In: 1827 [P.O.:1020; I.V.:807]  Out: 2500 [Urine:2500]    Constitutional:  alert, cooperative, no apparent distress  Respiratory: No increased work of breathing, good air exchange, no crackles or wheezing.  Cardiovascular: apical impulse,normal S1 and S2  GI: bowel sounds present, soft, non-distended, non-tender      Discharge Disposition   Discharged to short-term care facility    Consultations This Hospital Stay   PHYSICAL THERAPY ADULT IP CONSULT  OCCUPATIONAL THERAPY ADULT IP CONSULT  SOCIAL WORK IP CONSULT  ORTHOPEDIC SURGERY IP CONSULT  OCCUPATIONAL THERAPY ADULT IP CONSULT  PHYSICAL THERAPY ADULT IP CONSULT  SOCIAL WORK IP CONSULT  PHYSICAL THERAPY ADULT IP CONSULT  OCCUPATIONAL THERAPY ADULT IP CONSULT  PHARMACY DISCHARGE EDUCATION BY PHARMACIST    Time Spent on this Encounter   Yovani CERRATO, personally saw the patient today and spent greater than 30 minutes discharging this patient.      Discharge Medications   Current Discharge Medication List      START taking these medications    Details   acetaminophen (TYLENOL) 325 MG tablet Take 2 tablets (650 mg) by mouth every 4 hours as needed for other (multimodal surgical pain management along with NSAIDS and  "opioid medication as indicated based on pain control and physical function.)  Qty: 30 tablet, Refills: 0    Associated Diagnoses: Closed displaced fracture of right femoral neck (H)      aspirin (ASA) 325 MG EC tablet Take 1 tablet (325 mg) by mouth daily  Qty: 30 tablet, Refills: 0    Associated Diagnoses: Closed displaced fracture of right femoral neck (H)      hydrOXYzine (ATARAX) 25 MG tablet Take 1-2 tablets (25-50 mg) by mouth every 6 hours as needed for itching or anxiety (spasm)  Qty: 20 tablet, Refills: 0    Associated Diagnoses: Closed displaced fracture of right femoral neck (H)      ondansetron (ZOFRAN-ODT) 4 MG ODT tab Take 1 tablet (4 mg) by mouth every 6 hours as needed for nausea or vomiting  Qty: 10 tablet, Refills: 0    Associated Diagnoses: Closed displaced fracture of right femoral neck (H)      oxyCODONE (ROXICODONE) 5 MG tablet Take 1-2 tablets (5-10 mg) by mouth every 4 hours as needed 1 tab po q 4 hrs prn pain scale \"1-5\"  2 tabs po q 4 hrs prn pain scale \"6-10\"  Qty: 30 tablet, Refills: 0    Associated Diagnoses: Closed displaced fracture of right femoral neck (H)      senna-docusate (SENOKOT-S/PERICOLACE) 8.6-50 MG tablet Take 1 tablet by mouth 2 times daily  Qty: 20 tablet, Refills: 0    Associated Diagnoses: Closed displaced fracture of right femoral neck (H)         CONTINUE these medications which have NOT CHANGED    Details   !! buPROPion (WELLBUTRIN XL) 150 MG 24 hr tablet Take 150 mg by mouth every morning       !! buPROPion (WELLBUTRIN XL) 300 MG 24 hr tablet Take 300 mg by mouth every morning       DULoxetine (CYMBALTA) 60 MG capsule Take 60 mg by mouth daily       gabapentin (NEURONTIN) 300 MG capsule Take 900 mg by mouth 3 times daily       guanFACINE (TENEX) 2 MG tablet Take 2 mg by mouth At Bedtime       LORazepam (ATIVAN) 1 MG tablet Take 1 mg by mouth daily as needed for anxiety       naproxen sodium (ANAPROX) 550 MG tablet Take 550 mg by mouth daily (with breakfast)     "   traZODone (DESYREL) 50 MG tablet Take 100 mg by mouth At Bedtime        !! - Potential duplicate medications found. Please discuss with provider.        Allergies   No Known Allergies  Data   Most Recent 3 CBC's:  Recent Labs   Lab Test 06/21/19  0656 06/20/19  0638 06/19/19  1129  06/17/19  1648   WBC  --  9.1  --   --  13.3*   HGB 8.9* 8.8* 9.0*   < > 13.4   MCV  --  90  --   --  87   PLT  --  136*  --   --  240    < > = values in this interval not displayed.      Most Recent 3 BMP's:  Recent Labs   Lab Test 06/20/19  0638 06/19/19  0700 06/17/19  1648     --  142   POTASSIUM 4.3  --  4.6   CHLORIDE 109  --  109   CO2 28  --  29   BUN 12  --  22   CR 0.81  --  0.97   ANIONGAP 2*  --  4   ESTHER 7.7*  --  8.8   * 116* 127*     Most Recent 2 LFT's:No lab results found.  Most Recent INR's and Anticoagulation Dosing History:  Anticoagulation Dose History     Recent Dosing and Labs Latest Ref Rng & Units 6/17/2019 6/18/2019    INR 0.86 - 1.14 1.04 1.08        Most Recent 3 Troponin's:No lab results found.  Most Recent Cholesterol Panel:No lab results found.  Most Recent 6 Bacteria Isolates From Any Culture (See EPIC Reports for Culture Details):No lab results found.  Most Recent TSH, T4 and A1c Labs:No lab results found.  Results for orders placed or performed during the hospital encounter of 06/17/19   XR Pelvis w Hip Right 1 View    Narrative    PELVIS AND HIP RIGHT ONE VIEW   6/17/2019 3:59 PM     HISTORY: Right hip pain after fall.    COMPARISON: None.      Impression    IMPRESSION: Acute right femoral neck fracture that appears impacted.  Posterior angulation of the distal fracture fragment. Right femoral  head appears to be located. Left hip is unremarkable.    JAROCHO COLLIER MD   XR Hip Port Right 1 View    Narrative    This exam was marked as non-reportable because it will not be read by a   radiologist or a [place] non-radiologist provider.             XR Pelvis w Hip Port Right 1 View    Narrative     PORTABLE ONE VIEW PELVIS AND ONE VIEW RIGHT HIP 6/18/2019 11:25 AM    HISTORY: Postoperative evaluation of the right hip.    COMPARISON: An intraoperative radiograph earlier today.    FINDINGS: There are surgical changes of a right total hip  arthroplasty. The hardware is intact with no fracture or other  complication seen. Lateral skin staples are seen. A soft tissue drain  is present. No other abnormality is demonstrated.      Impression    IMPRESSION: Unremarkable postoperative appearance of the right hip.    FERNANDO SALGUERO MD   CT Pelvis Bone wo Contrast    Narrative    CT PELVIS WITHOUT CONTRAST   6/20/2019 4:42 PM    HISTORY: Right low back and right lateral hip pain. Right hip  arthroplasty on 6/18/2019.    COMPARISON: Radiographs on 6/18/2019 at 11:12 AM.    TECHNIQUE: CT imaging is performed through the pelvis and hips without  contrast. Radiation dose for this scan was reduced using automated  exposure control, adjustment of the mA and/or kV according to patient  size, or iterative reconstruction technique.     FINDINGS: There are surgical changes of a right total hip  arthroplasty. The hardware is intact with no fracture or evidence of  loosening. The osseous structures are otherwise unremarkable. There  are degenerative changes in the visualized lower lumbar spine.  Surgical staples are seen lateral to the right hip. No other soft  tissue pathology is noted.      Impression    IMPRESSION: Unremarkable postoperative appearance of a right total hip  arthroplasty.     FERNANDO SALGUERO MD           Disclaimer: This note consists of symbols derived from keyboarding, dictation and/or voice recognition software. As a result, there may be errors in the script that have gone undetected. Please consider this when interpreting information found in this chart.

## 2019-06-21 NOTE — PLAN OF CARE
"  Discharge Planner PT   Patient plan for discharge: none stated  Current status: Entered room to find pt about long-term off of bed, both LE over the edge. When asked pt reports, \"I was trying to get up on my own because PT never came\". This writer arrived to room at 1135, when scheduled PT time was 1130. The pt was unable to problem solve how to use call light once she realized that she could not get OOB on own. Did not recognize this writer at the PT who worked with her yesterday. Pt attempts to return legs to bed, and able to make minimal progress with Ax1. Eventually, Pt requires maxAx2 to safely return to midline supine in bed. Poor tolerance to any mobility 2/2 pain, declines further attempts at mobility. Re-educated pt to call for staff assist for any attempts at mobility, and gave pt call light. Also ensured bed alarm was activated.   Barriers to return to prior living situation: high pain, low BP and limited activity tolerance  Recommendations for discharge: TCU  Rationale for recommendations: Pt is not currently at baseline for mobility, and is unsafe to discharge home. With continued PT, both IP and after discharge, pt is more likely to obtain mobility goals.        Entered by: Deanna Cueva 06/21/2019 12:43 PM         "

## 2019-06-21 NOTE — PLAN OF CARE
"VS /46 (BP Location: Left arm)   Pulse 67   Temp 99  F (37.2  C) (Oral)   Resp 18   Ht 1.753 m (5' 9\")   Wt 70.3 kg (155 lb)   SpO2 95%   BMI 22.89 kg/m    Orientation A&Ox4. Slightly lethargic.   Lung sounds Clear  O2 2 LPM   Bowel sounds Audible. Denies any nausea.   Tolerating Reg diet   mL/hr LR  Dressings Scant dried blood to drsg  Tests none   CMS intact.   Drains Ward patent   Activity up with 3 assist/Jannette steady  Pain Controlled with Oxy /Atarax/Ativan scheduled tylenol and trazodone.   Discharge plan Discharge TBD. Plan to continue supportive cares and will continue to monitor.     "

## 2019-06-21 NOTE — PROGRESS NOTES
[facility]  Hospitalist Progress Note  Admit 6/17/2019  3:01 PM    Name: Greyson Rogers    MRN: [id number]  Provider:  Yovani Nation MD, [facility]    Date of Service: 06/21/2019     Reason for Stay (Diagnosis): Right femoral neck fracture status post total hip arthroplasty         Summary of hospital stay & Assessment/Plan:   Summary of Stay: Greyson Rogers is a 59 year old female who was admitted on 6/17/2019  59 year old female with PMH including MDD, anxiety, HLD, and chronic low back pain who was admitted on 6/17/2019 with following a mechanical fall landing on her right hip resulting in a right femoral neck fracture on pelvis x-ray.  Orthopedics consulted and underwent right EVAN in 6/18.  Having some issues with postoperative pain requiring multimodal therapy and frequent opiates.  Blood pressure lower than baseline likely secondary to opiates.  Some acute blood loss anemia, but this appears stable.  PT and OT recommending TCU.  Requiring some oxygen, likely secondary to atelectasis and shallow inspiration due to pain.  Had to decrease dosing and frequency of opiates due to excessive sedation.  Continue to work on pain control today while monitoring for excessive sedation     Problem List:     Mechanical fall, right femoral neck fracture:  Patient tripped while walking her dog and landed on her right hip.  Unable to ambulate secondary to pain.  No other areas of pain.  Pelvis x-ray showing a impacted right femoral neck fracture.   -Orthopedics consulted, underwent right EVAN 6/18  -Increased pain specifically spasm in the right hip with holding opiates due to sedation and hypotension overnight postop.   Continues to have significant difficulty with any movement, severe anxiety issues, urinary retention and hypoxia  -PT, OT consulted during her stay  -Xarelto for DVT prophylaxis per orthopedics during her hospital stay and aspirin on discharge      MDD, anxiety: Continue PTA Wellbutrin  mg daily,  Cymbalta 80 mg daily, guanfacine 2 mg daily, trazodone 100 mg at bedtime.    She is on lorazepam chronically at home      HLD, hypertriglyceridemia: Not currently on medications for this.     Chronic low back pain: Degenerative disease on previous x-rays.  Continue PTA gabapentin 300 mg 3 times daily.      Acute blood loss anemia: Hemoglobin 13.4 prior to surgery.  Down to 8.8 postop day 2.  8.9 postop day 3.  No evidence of active bleeding, likely related to hip fracture, surgery,  dilutional      DVT Prophylaxis: Xarelto in the hospital  Code Status:  Full Code    Disposition Plan     Expected discharge in initially the hope was to get her to TCU today however patient is having significant difficulty with pain, not transferring or doing any therapy, still hypoxic in the mid 80% and urinary retention.  Discharge will be put on hold till tomorrow to reevaluate.       Entered: Yovani Howeda 06/21/2019, 3:45 PM                 Interval History:       Patient anxious tremulous refusing any therapy, did not even get out of bed today, last night she was having difficulty with hypotension however that improved today, still having hypoxia off oxygen in the mid 80%.    Case discussed with patient nurse on multiple occasions             Physical Exam:   Physical Exam   Temp: 99  F (37.2  C) Temp src: Oral BP: 143/58 Pulse: 78 Heart Rate: 88 Resp: 16 SpO2: 91 % O2 Device: Nasal cannula Oxygen Delivery: 2 LPM  Vitals:    06/17/19 1509   Weight: 70.3 kg (155 lb)     I/O last 3 completed shifts:  In: 1350 [P.O.:540; I.V.:810]  Out: 2950 [Urine:2950]      GENERAL:  Comfortable.   PSYCH: pleasant, oriented, No acute distress.  EYES: PERRLA, Normal conjunctiva.  HEART:  Normal S1, S2 with no edema.  LUNGS:  Clear to auscultation, normal Respiratory effort.  ABDOMEN:  Soft, no hepatosplenomegaly, normal bowel sounds.  SKIN:  Dry to touch, No rash.      Medications       buPROPion  450 mg Oral Daily     DULoxetine  60 mg Oral  Daily     gabapentin  300 mg Oral TID     guanFACINE  2 mg Oral At Bedtime     rivaroxaban ANTICOAGULANT  10 mg Oral Daily     senna-docusate  1 tablet Oral BID    Or     senna-docusate  2 tablet Oral BID     sodium chloride (PF)  3 mL Intracatheter Q8H     traZODone  100 mg Oral At Bedtime     Data     -Data reviewed today:  I personally reviewed  all new labs and imaging results over the last 24 hours.    Recent Labs   Lab 06/21/19  0656 06/20/19  0638 06/19/19  1129  06/17/19  1648   WBC  --  9.1  --   --  13.3*   HGB 8.9* 8.8* 9.0*   < > 13.4   HCT  --  27.4*  --   --  40.4   MCV  --  90  --   --  87   PLT  --  136*  --   --  240    < > = values in this interval not displayed.     Recent Labs   Lab 06/20/19  0638 06/19/19  0700 06/17/19  1648     --  142   POTASSIUM 4.3  --  4.6   CHLORIDE 109  --  109   CO2 28  --  29   ANIONGAP 2*  --  4   * 116* 127*   BUN 12  --  22   CR 0.81  --  0.97   GFRESTIMATED 79  --  63   GFRESTBLACK >90  --  73   ESTHER 7.7*  --  8.8       Recent Results (from the past 24 hour(s))   CT Pelvis Bone wo Contrast    Narrative    CT PELVIS WITHOUT CONTRAST   6/20/2019 4:42 PM    HISTORY: Right low back and right lateral hip pain. Right hip  arthroplasty on 6/18/2019.    COMPARISON: Radiographs on 6/18/2019 at 11:12 AM.    TECHNIQUE: CT imaging is performed through the pelvis and hips without  contrast. Radiation dose for this scan was reduced using automated  exposure control, adjustment of the mA and/or kV according to patient  size, or iterative reconstruction technique.     FINDINGS: There are surgical changes of a right total hip  arthroplasty. The hardware is intact with no fracture or evidence of  loosening. The osseous structures are otherwise unremarkable. There  are degenerative changes in the visualized lower lumbar spine.  Surgical staples are seen lateral to the right hip. No other soft  tissue pathology is noted.      Impression    IMPRESSION: Unremarkable  postoperative appearance of a right total hip  arthroplasty.     FERNANDO SALGUERO MD       This document was produced using voice recognition software

## 2019-06-22 VITALS
HEART RATE: 78 BPM | RESPIRATION RATE: 16 BRPM | BODY MASS INDEX: 22.96 KG/M2 | OXYGEN SATURATION: 90 % | WEIGHT: 155 LBS | HEIGHT: 69 IN | TEMPERATURE: 98.7 F | SYSTOLIC BLOOD PRESSURE: 127 MMHG | DIASTOLIC BLOOD PRESSURE: 70 MMHG

## 2019-06-22 PROCEDURE — 99239 HOSP IP/OBS DSCHRG MGMT >30: CPT | Performed by: INTERNAL MEDICINE

## 2019-06-22 PROCEDURE — 25000132 ZZH RX MED GY IP 250 OP 250 PS 637: Performed by: INTERNAL MEDICINE

## 2019-06-22 PROCEDURE — 25000132 ZZH RX MED GY IP 250 OP 250 PS 637: Performed by: PHYSICIAN ASSISTANT

## 2019-06-22 PROCEDURE — 25000132 ZZH RX MED GY IP 250 OP 250 PS 637: Performed by: ORTHOPAEDIC SURGERY

## 2019-06-22 RX ORDER — LORAZEPAM 1 MG/1
1 TABLET ORAL 2 TIMES DAILY PRN
Qty: 20 TABLET | Refills: 0 | Status: SHIPPED | OUTPATIENT
Start: 2019-06-22

## 2019-06-22 RX ORDER — HYDROXYZINE HYDROCHLORIDE 25 MG/1
25 TABLET, FILM COATED ORAL EVERY 6 HOURS PRN
Qty: 20 TABLET | Refills: 0 | Status: SHIPPED | OUTPATIENT
Start: 2019-06-22 | End: 2019-06-26

## 2019-06-22 RX ADMIN — BUPROPION HYDROCHLORIDE 450 MG: 150 TABLET, FILM COATED, EXTENDED RELEASE ORAL at 08:33

## 2019-06-22 RX ADMIN — CYCLOBENZAPRINE HYDROCHLORIDE 10 MG: 10 TABLET, FILM COATED ORAL at 08:33

## 2019-06-22 RX ADMIN — RIVAROXABAN 10 MG: 10 TABLET, FILM COATED ORAL at 08:33

## 2019-06-22 RX ADMIN — DULOXETINE HYDROCHLORIDE 60 MG: 60 CAPSULE, DELAYED RELEASE ORAL at 08:34

## 2019-06-22 RX ADMIN — GABAPENTIN 300 MG: 300 CAPSULE ORAL at 08:34

## 2019-06-22 RX ADMIN — SENNOSIDES AND DOCUSATE SODIUM 2 TABLET: 8.6; 5 TABLET ORAL at 08:34

## 2019-06-22 RX ADMIN — OXYCODONE HYDROCHLORIDE 5 MG: 5 TABLET ORAL at 04:26

## 2019-06-22 RX ADMIN — GABAPENTIN 300 MG: 300 CAPSULE ORAL at 14:37

## 2019-06-22 RX ADMIN — HYDROXYZINE HYDROCHLORIDE 25 MG: 25 TABLET ORAL at 12:50

## 2019-06-22 RX ADMIN — OXYCODONE HYDROCHLORIDE 5 MG: 5 TABLET ORAL at 10:40

## 2019-06-22 RX ADMIN — OXYCODONE HYDROCHLORIDE 5 MG: 5 TABLET ORAL at 14:37

## 2019-06-22 ASSESSMENT — ACTIVITIES OF DAILY LIVING (ADL)
ADLS_ACUITY_SCORE: 14
ADLS_ACUITY_SCORE: 13
ADLS_ACUITY_SCORE: 14
ADLS_ACUITY_SCORE: 14

## 2019-06-22 NOTE — PLAN OF CARE
Pt A&O x4. Low grade temp-99.9, IS and deep breathing encouraged. Recheck 98.9. 2L nasal cannula, unable to wean. CMS intact. Aquacel to R hip has small amount of drainage. Reg diet, tolerating well. Voiding adequately. Large bm this afternoon. Up with A2 walker and gait belt to bedside commode/dennys steady if longer distance. Needs a lot of encouragement and cueing. Pt showered tonight. Nickel sized red area on R buttock noted, pt states is tender, barrier cream applied. Took PRN oxy, flexeril, and tylenol to manage pain. Discharge tomorrow to [facility] TCU.

## 2019-06-22 NOTE — PROVIDER NOTIFICATION
Provider paged at 1237: TCU needs hard script for Ativan for discharge as well as an order for continued supplemental O2 for the TCU. TCU needs these ASAP. Thanks.    1247: Orders placed

## 2019-06-22 NOTE — PLAN OF CARE
Physical Therapy Discharge Summary    Reason for therapy discharge:    Anticipated discharge today to TCU    Progress towards therapy goal(s). See goals on Care Plan in [facility] electronic health record for goal details.  Goals not met.  Barriers to achieving goals:   discharge from facility.    Therapy recommendation(s):    Continued therapy is recommended.  Rationale/Recommendations:  Patient would benefit from ongoing physical therapy at TCU in order to increase strength and independence with mobility.

## 2019-06-22 NOTE — PROGRESS NOTES
"[facility]  Orthopedics Progress Note       S:  POD #  4 s/p Procedure(s):  Right  total hip arthroplasty  Patient reports her pain is 10/10 at this time.  She feels she is having another muscle spasm.  She is workign with PT/OT but had difficulty yesterday because PT came while she was having \"one of my spells\" and she could not participate at that time.  She was able to void this morning.  The plan is for her to go to TCU later today.      O:  Blood pressure 115/63, pulse 78, temperature 98.8  F (37.1  C), temperature source Oral, resp. rate 16, height 1.753 m (5' 9\"), weight 70.3 kg (155 lb), SpO2 93 %.  Lab Results   Component Value Date    HGB 8.9 06/21/2019       Patient is sitting in bed with her right leg supported by pillows.  She is in no acute distress.  Her dressing has evidence for drainage, but this is less than 50%, it is clean dry and intact externally.  There is no surrounding erythema She is able to initiate lifting the leg but it does not really move far, she can initiate bending the knee but is limited by weakness and discomfort.  She can move her ankle and wiggle the toes without difficulty.  Neurovascular exam is intact.  Her calves are soft and nontender to palpation.      A/P:  POD # 4 s/p Procedure(s):  Right  total hip arthroplasty  Patient reports ongoing spasms, flexeril added yesterday, need to start with a low dose due to difficulty with sedation.  Patient should continue to work with PT/OT, she was encouraged to participate as much as possible.  Oral medication as needed for pain, trying to wean narcotic use as able, if patient has improvement with flexeril but sedation, could try lowering oxycodone dose to allow for flexeril use  Xarelto in hospital, aspirin for DVT prophylaxis upon discharge  WBAT with a walker  Change post op dressing if saturated >60%   Follow up with Dr Zhou in 2 weeks  Anticipate discharge to TCU later today    Brent Stafford PA-C      "

## 2019-06-22 NOTE — PLAN OF CARE
Patient discharged to [facility] transported via [facility]. Sent with discharge packet. Orders faxed to facility.

## 2019-06-22 NOTE — PLAN OF CARE
Vital signs:  Temp: 99.3  F (37.4  C) Temp src: Oral BP: 110/52 Pulse: 78 Heart Rate: 81 Resp: 16 SpO2: 95 %    Neuros; Neuros intact  GI: bowl sounds positive x 4  : Voiding well  SKIN: Incision to right hip intact with old dried shadow drainage  Activity: Assist of 2 with dennys steady  Diet: regular  Pain: taking 5 mg of oxycodone for pain with adequate relief  Plan:  discharge to [facility] TCU today

## 2019-06-23 ENCOUNTER — TELEPHONE (OUTPATIENT)
Dept: GERIATRICS | Facility: CLINIC | Age: 59
End: 2019-06-23

## 2019-06-24 ENCOUNTER — NURSING HOME VISIT (OUTPATIENT)
Dept: GERIATRICS | Facility: CLINIC | Age: 59
End: 2019-06-24
Payer: COMMERCIAL

## 2019-06-24 VITALS
WEIGHT: 174.6 LBS | RESPIRATION RATE: 16 BRPM | TEMPERATURE: 97.4 F | SYSTOLIC BLOOD PRESSURE: 116 MMHG | HEIGHT: 69 IN | BODY MASS INDEX: 25.86 KG/M2 | DIASTOLIC BLOOD PRESSURE: 72 MMHG | HEART RATE: 77 BPM | OXYGEN SATURATION: 89 %

## 2019-06-24 DIAGNOSIS — G89.29 CHRONIC RIGHT-SIDED LOW BACK PAIN WITHOUT SCIATICA: ICD-10-CM

## 2019-06-24 DIAGNOSIS — R53.81 PHYSICAL DECONDITIONING: ICD-10-CM
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
## 2019-06-24 DIAGNOSIS — F41.9 ANXIETY: ICD-10-CM
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
## 2019-06-24 DIAGNOSIS — F33.9 RECURRENT MAJOR DEPRESSIVE DISORDER, REMISSION STATUS UNSPECIFIED (H): ICD-10-CM

## 2019-06-24 DIAGNOSIS — M54.50 CHRONIC RIGHT-SIDED LOW BACK PAIN WITHOUT SCIATICA: ICD-10-CM

## 2019-06-24 DIAGNOSIS — D62 ANEMIA DUE TO BLOOD LOSS, ACUTE: ICD-10-CM

## 2019-06-24 DIAGNOSIS — S72.001A CLOSED DISPLACED FRACTURE OF RIGHT FEMORAL NECK (H): Primary | ICD-10-CM

## 2019-06-24 DIAGNOSIS — J18.9 HAP (HOSPITAL-ACQUIRED PNEUMONIA): ICD-10-CM

## 2019-06-24 PROCEDURE — 99310 SBSQ NF CARE HIGH MDM 45: CPT | Performed by: NURSE PRACTITIONER

## 2019-06-24 RX ORDER — LORAZEPAM 1 MG/1
1 TABLET ORAL 2 TIMES DAILY PRN
Status: CANCELLED | OUTPATIENT
Start: 2019-06-24

## 2019-06-24 RX ORDER — OXYCODONE HYDROCHLORIDE 5 MG/1
5-10 TABLET ORAL EVERY 4 HOURS PRN
Qty: 12 TABLET | Refills: 0 | Status: CANCELLED | OUTPATIENT
Start: 2019-06-24 | End: 2019-06-27

## 2019-06-24 ASSESSMENT — MIFFLIN-ST. JEOR: SCORE: 1426.36

## 2019-06-24 NOTE — PROGRESS NOTES
"[facility]  PRIMARY CARE PROVIDER AND CLINIC:  Shakira Edwards, NP, [facility] [address] [place]   / [place]*  Chief Complaint   Patient presents with     Hospital F/U     [facility] Medical Record Number:  [id number]  Place of Service where encounter took place:  [facility]  ([facility]) [[id number]]    Greyson Rogers  is a 59 year old  (1960), admitted to the above facility from  [facility]. Hospital stay 6/17/2019 through 6/22/2019..  Admitted to this facility for  rehab, medical management and nursing care.    HPI:    HPI information obtained from: facility chart records, facility staff, patient report and [facility] chart review.   Brief Summary of Hospital Course:     59 y.o female with PMH MDD, anxiety, HLD, and chronic low back pain admitted to hospital as above following mechanical fall and resultant right femoral neck fracture. Is s/p right EVAN 6/18.. Developed some acute blood loss anemia, stable and did not require transfusion. Required O2 thought 2/2 atelectasis. Pain difficult to manage, but opiates needed to be decreased 2/2 excessive sedation. Patient admitted to TCU for acute rehab.     Updates on Status Since Skilled nursing Admission:     Per nursing patient has been reporting severe pain and pain has not been controlled with current pain regimen. In addition patient was transferred to TCU off O2 but nursing put O2 on patient over the weekend 2/2 O2 sats in 80s on room air.     Currently patient is found sitting up in room. Is wearing O2. Alert but often avoids eye contact and appears anxious. Reports severe pain to right hip with movement. UE are shaking/gross tremors bilaterally. Per patient this is chronic and 2/2 anxiety and started some time ago. Denies SOB or chest pain. Reports intermittent nonproductive cough. Reports appetite is okay. States slept \"a little\" due to pain and new surroundings had some insomnia. Denies bowel or " bladder issues. VS reviewed and stable.     CODE STATUS/ADVANCE DIRECTIVES DISCUSSION:   CPR/Full code   Patient's living condition: lives with family, adult children in a mobile home   ALLERGIES: Patient has no known allergies.  PAST MEDICAL HISTORY:  has a past medical history of Depressive disorder.  PAST SURGICAL HISTORY:   has a past surgical history that includes Arthroplasty hip (Right, 6/18/2019).  FAMILY HISTORY: family history is not on file.  SOCIAL HISTORY:   reports that she has never smoked. She has never used smokeless tobacco. She reports that she does not drink alcohol or use drugs.    Post Discharge Medication Reconciliation Status: discharge medications reconciled and changed, per note/orders (see AVS)    Current Outpatient Medications   Medication Sig Dispense Refill     acetaminophen (TYLENOL) 325 MG tablet Take 2 tablets (650 mg) by mouth every 4 hours as needed for other (multimodal surgical pain management along with NSAIDS and opioid medication as indicated based on pain control and physical function.) 30 tablet 0     aspirin (ASA) 325 MG EC tablet Take 650 mg by mouth every 4 hours as needed for moderate pain (for pain multimodal surgical pain mgmt. with NSAIDS and opioid medication as indicated based on pain control and physical function)       buPROPion (WELLBUTRIN XL) 150 MG 24 hr tablet Take 300 mg by mouth every morning AND Give 150 mg by mouth in the morning for depression       DULoxetine (CYMBALTA) 60 MG capsule Take 60 mg by mouth daily        gabapentin (NEURONTIN) 300 MG capsule Take 900 mg by mouth 3 times daily        guanFACINE (TENEX) 2 MG tablet Take 2 mg by mouth At Bedtime        hydrOXYzine (ATARAX) 25 MG tablet Take 1 tablet (25 mg) by mouth every 6 hours as needed for itching or anxiety (spasm) 20 tablet 0     LORazepam (ATIVAN) 1 MG tablet Take 1 tablet (1 mg) by mouth 2 times daily as needed for anxiety 20 tablet 0     naproxen sodium (ANAPROX) 550 MG tablet Take 550  "mg by mouth daily (with breakfast)        ondansetron (ZOFRAN-ODT) 4 MG ODT tab Take 1 tablet (4 mg) by mouth every 6 hours as needed for nausea or vomiting 10 tablet 0     oxyCODONE (ROXICODONE) 5 MG tablet Take 1-2 tablets (5-10 mg) by mouth every 4 hours as needed 1 tab po q 4 hrs prn pain scale \"1-5\"  2 tabs po q 4 hrs prn pain scale \"6-10\" 30 tablet 0     senna-docusate (SENOKOT-S/PERICOLACE) 8.6-50 MG tablet Take 1 tablet by mouth 2 times daily 20 tablet 0     traZODone (DESYREL) 50 MG tablet Take 100 mg by mouth At Bedtime        aspirin (ASA) 325 MG EC tablet Take 1 tablet (325 mg) by mouth daily (Patient not taking: Reported on 6/24/2019) 30 tablet 0     buPROPion (WELLBUTRIN XL) 300 MG 24 hr tablet Take 300 mg by mouth every morning          ROS:  10 point ROS of systems including Constitutional, Eyes, Respiratory, Cardiovascular, Gastroenterology, Genitourinary, Integumentary, Musculoskeletal, Psychiatric were all negative except for pertinent positives noted in my HPI.    Vitals:  /72   Pulse 77   Temp 97.4  F (36.3  C)   Resp 16   Ht 1.753 m (5' 9\")   Wt 79.2 kg (174 lb 9.6 oz)   SpO2 (!) 89%   BMI 25.78 kg/m    Exam:  GENERAL APPEARANCE: Alert, in no distress   ENT: Mouth and posterior oropharynx normal, moist mucous membranes   EYES: EOM, conjunctivae, lids, pupils and irises normal   NECK: No adenopathy,masses or thyromegaly   RESP: respiratory effort and palpation of chest normal, Lungs clear to auscultation- bu decreased bilateral bases  CV: Palpation and auscultation of heart done , regular rate and rhythm, no murmur, rub, or gallop, peripheral edema 1+ bilateral LE  ABDOMEN: normal bowel sounds, soft, nontender, no hepatosplenomegaly or other masses   : palpation of bladder WNL   M/S: Gait and station normal   Digits and nails normal - ANDERSON  SKIN: Inspection of skin and subcutaneous tissue baseline, Palpation of skin and subcutaneous tissue baseline, Aquacel to right hip with " moderate amount of serosanguinous drainage. Seal intact and surrounding tissue clear  NEURO: Cranial nerves 2-12 are normal tested and grossly at patient's baseline, Examination of sensation by touch normal   PSYCH: oriented X 3, affect and mood normal           Lab/Diagnostic data:  Recent labs in [facility] reviewed by me today.     ASSESSMENT/PLAN:  Closed displaced fracture of right femoral neck (H)  Fall, subsequent  Encounter  S/p EVAN 6/18 Dr. Zaheer Zhou  - pain not currently controlled - suspect anxiety and anticipatory component  - schedule acetaminophen and continue some prn dosing  - change hydrOXYzine (ATARAX) 25 MG tablet; Take 1 tablet (25 mg) by mouth 2 times daily And q 6 hour prn with daily prn max of 2 doses  - continue prn oxycodone- observe for SE  - ice and positioning prn  - WBAT- PT/OT  - keep incision clean, dry and intact  - f/w ortho in 2 weeks      HAP (hospital-acquired pneumonia)  - persistent post op hypoxia, patient unable to corretcly use IS despite instruction reiteration. CXR ordered and reviewed- LLL infiltrate  - levofloxacin (LEVAQUIN) 500 MG tablet; Take 1 tablet (500 mg) by mouth daily for 7 days  - albuterol (PROVENTIL) (2.5 MG/3ML) 0.083% neb solution; Take 1 vial (2.5 mg) by nebulization 2 times daily for 7 days  - mobilize, enc cough and deep breathing  - VS per unit protocol  - follow clincially  - wean O2 as able/keep sats > 90%    Recurrent major depressive disorder, remission status unspecified (H)  Anxiety  - appears anxious- tremulous at times (states is chronic)  - continue longstanding pharmacotherapy with Cymbalta, Wellbutrin and Trazodone, and prn Lorazepam for now and continue to monitor/follow clinically- some concern for ? Serotonin syndrome or akathesia- and may benefit from dosage reduction  - consider ACP referral    Chronic right-sided low back pain without sciatica  - noted, stable-   - pain regimen as above    Anemia due to blood loss, acute  HGB 13.4 --> 8.9  postop- hemodynamically stable  - observe for s/s bleeding  - recheck HGB    Physical deconditioning  2/2 above  - lives with adult children  - PT/OT  - ongoing discharge planning, SW follow and care conferences per unit protocol    Total unit floor time 70 minutes- Time consisted of examination of patient, review of patient medical records, labs, imaging and current admission orders/clarification of orders. 40 minutes spent onf coordination of care with nursing and rehab relating to hypoxia workup and interventions and pain management and counseling patient regarding the same, and discussed/informed of CXR results and plan of care/interventions including risks/benefits.         Orders written by provider at facility      Electronically signed by:  KIMBERLEY Henderson CNP

## 2019-06-24 NOTE — TELEPHONE ENCOUNTER
RN called to report hypotension. Takes guanfacine daily; however, has not received since admission due to pharmacy. Order given to hold until available. Remains asymptomatic. /61. Resident remains asymptomatic.    Plan:  1.) Encourage PO intake   2.) CBC/BMP tomorrow. Last hemoglobin 8.9 on 6/21/19  3.) Follow up tomorrow with primary NP. Guanfacine will be available 6/24/19 per staff report. Call if becomes symptomatic.     KIMBERLEY Dowling [facility]

## 2019-06-24 NOTE — LETTER
6/24/2019        RE: Greyson Rogers  [address]  [facility] [address]        [facility]  PRIMARY CARE PROVIDER AND CLINIC:  Shakira Edwards, NP, [place] [address] [place]   / [place]*  Chief Complaint   Patient presents with     Hospital F/U     [facility] Medical Record Number:  [id number]  Place of Service where encounter took place:  [facility]  (S) [[id number]]    Greyson Rogers  is a 59 year old  (1960), admitted to the above facility from  [facility]. Hospital stay 6/17/2019 through 6/22/2019..  Admitted to this facility for  rehab, medical management and nursing care.    HPI:    HPI information obtained from: facility chart records, facility staff, patient report and [facility] chart review.   Brief Summary of Hospital Course:     59 y.o female with PMH MDD, anxiety, HLD, and chronic low back pain admitted to hospital as above following mechanical fall and resultant right femoral neck fracture. Is s/p right EVAN 6/18.. Developed some acute blood loss anemia, stable and did not require transfusion. Required O2 thought 2/2 atelectasis. Pain difficult to manage, but opiates needed to be decreased 2/2 excessive sedation. Patient admitted to TCU for acute rehab.     Updates on Status Since Skilled nursing Admission:     Per nursing patient has been reporting severe pain and pain has not been controlled with current pain regimen. In addition patient was transferred to TCU off O2 but nursing put O2 on patient over the weekend 2/2 O2 sats in 80s on room air.     Currently patient is found sitting up in room. Is wearing O2. Alert but often avoids eye contact and appears anxious. Reports severe pain to right hip with movement. UE are shaking/gross tremors bilaterally. Per patient this is chronic and 2/2 anxiety and started some time ago. Denies SOB or chest pain. Reports intermittent nonproductive cough. Reports appetite is  "okay. States slept \"a little\" due to pain and new surroundings had some insomnia. Denies bowel or bladder issues. VS reviewed and stable.     CODE STATUS/ADVANCE DIRECTIVES DISCUSSION:   CPR/Full code   Patient's living condition: lives with family, adult children in a mobile home   ALLERGIES: Patient has no known allergies.  PAST MEDICAL HISTORY:  has a past medical history of Depressive disorder.  PAST SURGICAL HISTORY:   has a past surgical history that includes Arthroplasty hip (Right, 6/18/2019).  FAMILY HISTORY: family history is not on file.  SOCIAL HISTORY:   reports that she has never smoked. She has never used smokeless tobacco. She reports that she does not drink alcohol or use drugs.    Post Discharge Medication Reconciliation Status: discharge medications reconciled and changed, per note/orders (see AVS)    Current Outpatient Medications   Medication Sig Dispense Refill     acetaminophen (TYLENOL) 325 MG tablet Take 2 tablets (650 mg) by mouth every 4 hours as needed for other (multimodal surgical pain management along with NSAIDS and opioid medication as indicated based on pain control and physical function.) 30 tablet 0     aspirin (ASA) 325 MG EC tablet Take 650 mg by mouth every 4 hours as needed for moderate pain (for pain multimodal surgical pain mgmt. with NSAIDS and opioid medication as indicated based on pain control and physical function)       buPROPion (WELLBUTRIN XL) 150 MG 24 hr tablet Take 300 mg by mouth every morning AND Give 150 mg by mouth in the morning for depression       DULoxetine (CYMBALTA) 60 MG capsule Take 60 mg by mouth daily        gabapentin (NEURONTIN) 300 MG capsule Take 900 mg by mouth 3 times daily        guanFACINE (TENEX) 2 MG tablet Take 2 mg by mouth At Bedtime        hydrOXYzine (ATARAX) 25 MG tablet Take 1 tablet (25 mg) by mouth every 6 hours as needed for itching or anxiety (spasm) 20 tablet 0     LORazepam (ATIVAN) 1 MG tablet Take 1 tablet (1 mg) by mouth 2 " "times daily as needed for anxiety 20 tablet 0     naproxen sodium (ANAPROX) 550 MG tablet Take 550 mg by mouth daily (with breakfast)        ondansetron (ZOFRAN-ODT) 4 MG ODT tab Take 1 tablet (4 mg) by mouth every 6 hours as needed for nausea or vomiting 10 tablet 0     oxyCODONE (ROXICODONE) 5 MG tablet Take 1-2 tablets (5-10 mg) by mouth every 4 hours as needed 1 tab po q 4 hrs prn pain scale \"1-5\"  2 tabs po q 4 hrs prn pain scale \"6-10\" 30 tablet 0     senna-docusate (SENOKOT-S/PERICOLACE) 8.6-50 MG tablet Take 1 tablet by mouth 2 times daily 20 tablet 0     traZODone (DESYREL) 50 MG tablet Take 100 mg by mouth At Bedtime        aspirin (ASA) 325 MG EC tablet Take 1 tablet (325 mg) by mouth daily (Patient not taking: Reported on 6/24/2019) 30 tablet 0     buPROPion (WELLBUTRIN XL) 300 MG 24 hr tablet Take 300 mg by mouth every morning          ROS:  10 point ROS of systems including Constitutional, Eyes, Respiratory, Cardiovascular, Gastroenterology, Genitourinary, Integumentary, Musculoskeletal, Psychiatric were all negative except for pertinent positives noted in my HPI.    Vitals:  /72   Pulse 77   Temp 97.4  F (36.3  C)   Resp 16   Ht 1.753 m (5' 9\")   Wt 79.2 kg (174 lb 9.6 oz)   SpO2 (!) 89%   BMI 25.78 kg/m     Exam:  GENERAL APPEARANCE: Alert, in no distress   ENT: Mouth and posterior oropharynx normal, moist mucous membranes   EYES: EOM, conjunctivae, lids, pupils and irises normal   NECK: No adenopathy,masses or thyromegaly   RESP: respiratory effort and palpation of chest normal, Lungs clear to auscultation- bu decreased bilateral bases  CV: Palpation and auscultation of heart done , regular rate and rhythm, no murmur, rub, or gallop, peripheral edema 1+ bilateral LE  ABDOMEN: normal bowel sounds, soft, nontender, no hepatosplenomegaly or other masses   : palpation of bladder WNL   M/S: Gait and station normal   Digits and nails normal - ANDERSON  SKIN: Inspection of skin and subcutaneous " tissue baseline, Palpation of skin and subcutaneous tissue baseline, Aquacel to right hip with moderate amount of serosanguinous drainage. Seal intact and surrounding tissue clear  NEURO: Cranial nerves 2-12 are normal tested and grossly at patient's baseline, Examination of sensation by touch normal   PSYCH: oriented X 3, affect and mood normal           Lab/Diagnostic data:  Recent labs in [facility] reviewed by me today.     ASSESSMENT/PLAN:  Closed displaced fracture of right femoral neck (H)  Fall, subsequent  Encounter  S/p EVAN 6/18 Dr. Zaheer Zhou  - pain not currently controlled - suspect anxiety and anticipatory component  - schedule acetaminophen and continue some prn dosing  - change hydrOXYzine (ATARAX) 25 MG tablet; Take 1 tablet (25 mg) by mouth 2 times daily And q 6 hour prn with daily prn max of 2 doses  - continue prn oxycodone- observe for SE  - ice and positioning prn  - WBAT- PT/OT  - keep incision clean, dry and intact  - f/w ortho in 2 weeks      HAP (hospital-acquired pneumonia)  - persistent post op hypoxia, patient unable to corretcly use IS despite instruction reiteration. CXR ordered and reviewed- LLL infiltrate  - levofloxacin (LEVAQUIN) 500 MG tablet; Take 1 tablet (500 mg) by mouth daily for 7 days  - albuterol (PROVENTIL) (2.5 MG/3ML) 0.083% neb solution; Take 1 vial (2.5 mg) by nebulization 2 times daily for 7 days  - mobilize, enc cough and deep breathing  - VS per unit protocol  - follow clincially  - wean O2 as able/keep sats > 90%    Recurrent major depressive disorder, remission status unspecified (H)  Anxiety  - appears anxious- tremulous at times (states is chronic)  - continue longstanding pharmacotherapy with Cymbalta, Wellbutrin and Trazodone, and prn Lorazepam for now and continue to monitor/follow clinically- some concern for ? Serotonin syndrome or akathesia- and may benefit from dosage reduction  - consider ACP referral    Chronic right-sided low back pain without sciatica  -  noted, stable-   - pain regimen as above    Anemia due to blood loss, acute  HGB 13.4 --> 8.9 postop- hemodynamically stable  - observe for s/s bleeding  - recheck HGB    Physical deconditioning  2/2 above  - lives with adult children  - PT/OT  - ongoing discharge planning, SW follow and care conferences per unit protocol    Total unit floor time 70 minutes- Time consisted of examination of patient, review of patient medical records, labs, imaging and current admission orders/clarification of orders. 40 minutes spent onf coordination of care with nursing and rehab relating to hypoxia workup and interventions and pain management and counseling patient regarding the same, and discussed/informed of CXR results and plan of care/interventions including risks/benefits.         Orders written by provider at facility      Electronically signed by:  KIMBERLEY Henderson CNP                           Sincerely,        KIMBERLEY Henderson CNP

## 2019-06-26 ENCOUNTER — DOCUMENTATION ONLY (OUTPATIENT)
Dept: OTHER | Facility: CLINIC | Age: 59
End: 2019-06-26

## 2019-06-26 ENCOUNTER — HOSPITAL LABORATORY (OUTPATIENT)
Dept: OTHER | Facility: CLINIC | Age: 59
End: 2019-06-26

## 2019-06-26 LAB
ANION GAP SERPL CALCULATED.3IONS-SCNC: 4 MMOL/L (ref 3–14)
BASOPHILS # BLD AUTO: 0 10E9/L (ref 0–0.2)
BASOPHILS NFR BLD AUTO: 0.5 %
BUN SERPL-MCNC: 12 MG/DL (ref 7–30)
CALCIUM SERPL-MCNC: 8.6 MG/DL (ref 8.5–10.1)
CHLORIDE SERPL-SCNC: 109 MMOL/L (ref 94–109)
CO2 SERPL-SCNC: 29 MMOL/L (ref 20–32)
CREAT SERPL-MCNC: 0.84 MG/DL (ref 0.52–1.04)
DIFFERENTIAL METHOD BLD: ABNORMAL
EOSINOPHIL # BLD AUTO: 0.3 10E9/L (ref 0–0.7)
EOSINOPHIL NFR BLD AUTO: 5.1 %
ERYTHROCYTE [DISTWIDTH] IN BLOOD BY AUTOMATED COUNT: 13 % (ref 10–15)
GFR SERPL CREATININE-BSD FRML MDRD: 75 ML/MIN/{1.73_M2}
GLUCOSE SERPL-MCNC: 129 MG/DL (ref 70–99)
HCT VFR BLD AUTO: 29.6 % (ref 35–47)
HGB BLD-MCNC: 9.8 G/DL (ref 11.7–15.7)
IMM GRANULOCYTES # BLD: 0.1 10E9/L (ref 0–0.4)
IMM GRANULOCYTES NFR BLD: 1.1 %
LYMPHOCYTES # BLD AUTO: 1 10E9/L (ref 0.8–5.3)
LYMPHOCYTES NFR BLD AUTO: 15.6 %
MCH RBC QN AUTO: 28.5 PG (ref 26.5–33)
MCHC RBC AUTO-ENTMCNC: 33.1 G/DL (ref 31.5–36.5)
MCV RBC AUTO: 86 FL (ref 78–100)
MONOCYTES # BLD AUTO: 0.3 10E9/L (ref 0–1.3)
MONOCYTES NFR BLD AUTO: 4.8 %
NEUTROPHILS # BLD AUTO: 4.7 10E9/L (ref 1.6–8.3)
NEUTROPHILS NFR BLD AUTO: 72.9 %
NRBC # BLD AUTO: 0 10*3/UL
NRBC BLD AUTO-RTO: 0 /100
PLATELET # BLD AUTO: 375 10E9/L (ref 150–450)
POTASSIUM SERPL-SCNC: 3.8 MMOL/L (ref 3.4–5.3)
RBC # BLD AUTO: 3.44 10E12/L (ref 3.8–5.2)
SODIUM SERPL-SCNC: 142 MMOL/L (ref 133–144)
WBC # BLD AUTO: 6.4 10E9/L (ref 4–11)

## 2019-06-26 RX ORDER — ALBUTEROL SULFATE 0.83 MG/ML
2.5 SOLUTION RESPIRATORY (INHALATION) 2 TIMES DAILY
Qty: 42 ML
Start: 2019-06-26 | End: 2019-07-11

## 2019-06-26 RX ORDER — LEVOFLOXACIN 500 MG/1
500 TABLET, FILM COATED ORAL DAILY
Qty: 7 TABLET | Refills: 0
Start: 2019-06-26 | End: 2019-07-11

## 2019-06-26 RX ORDER — HYDROXYZINE HYDROCHLORIDE 25 MG/1
25 TABLET, FILM COATED ORAL 2 TIMES DAILY
Qty: 20 TABLET | Refills: 0
Start: 2019-06-26 | End: 2019-07-11

## 2019-06-27 ENCOUNTER — NURSING HOME VISIT (OUTPATIENT)
Dept: GERIATRICS | Facility: CLINIC | Age: 59
End: 2019-06-27
Payer: COMMERCIAL

## 2019-06-27 VITALS
HEIGHT: 69 IN | OXYGEN SATURATION: 94 % | BODY MASS INDEX: 25.86 KG/M2 | RESPIRATION RATE: 18 BRPM | HEART RATE: 85 BPM | TEMPERATURE: 97.5 F | DIASTOLIC BLOOD PRESSURE: 61 MMHG | WEIGHT: 174.6 LBS | SYSTOLIC BLOOD PRESSURE: 113 MMHG

## 2019-06-27 VITALS
OXYGEN SATURATION: 94 % | BODY MASS INDEX: 25.86 KG/M2 | HEART RATE: 85 BPM | HEIGHT: 69 IN | SYSTOLIC BLOOD PRESSURE: 113 MMHG | WEIGHT: 174.6 LBS | DIASTOLIC BLOOD PRESSURE: 61 MMHG | TEMPERATURE: 97.5 F | RESPIRATION RATE: 18 BRPM

## 2019-06-27 DIAGNOSIS — R21 RASH: Primary | ICD-10-CM

## 2019-06-27 DIAGNOSIS — J18.9 HCAP (HEALTHCARE-ASSOCIATED PNEUMONIA): ICD-10-CM

## 2019-06-27 DIAGNOSIS — F41.9 ANXIETY: ICD-10-CM

## 2019-06-27 PROCEDURE — 99309 SBSQ NF CARE MODERATE MDM 30: CPT | Performed by: NURSE PRACTITIONER

## 2019-06-27 RX ORDER — ACETAMINOPHEN 500 MG
500 TABLET ORAL 4 TIMES DAILY
COMMUNITY

## 2019-06-27 ASSESSMENT — MIFFLIN-ST. JEOR
SCORE: 1426.36
SCORE: 1426.36

## 2019-06-27 NOTE — LETTER
6/27/2019        RE: Greyson Rogers  [address]  [facility] [address]        [facility]  [facility] Medical Record Number:  [id number]  Place of Service where encounter took place:  [facility] [place]  ([facility]) [[id number]]  Chief Complaint   Patient presents with     Derm Problem       HPI:    Greyson Rogers  is a 59 year old (1960), who is being seen today for an episodic care visit.  HPI information obtained from: facility chart records, facility staff, patient report and [facility] chart review. Today's concern is:  Rash  - nursing reported noting buttocks rash yesterday. Barrier cream applied, writer summoned to examine.   No rash is present today. Patient denies pain, itching to area.    HCAP (healthcare-associated pneumonia)  - new on Levaquin this week. Staff are weaning O2 off.   Patient denies cough, fever, chills. VSS    Anxiety  - nsg reported patient quite anxious and expressing desire to leave yesterday. Today patient is calm and is agreeing to stay for acute rehab.       Past Medical and Surgical History reviewed in Epic today.    MEDICATIONS:  Current Outpatient Medications   Medication Sig Dispense Refill     acetaminophen (TYLENOL) 500 MG tablet Take 500 mg by mouth 4 times daily And Q6H PRN       albuterol (PROVENTIL) (2.5 MG/3ML) 0.083% neb solution Take 1 vial (2.5 mg) by nebulization 2 times daily for 7 days 42 mL      aspirin (ASA) 325 MG EC tablet Take 1 tablet (325 mg) by mouth daily 30 tablet 0     buPROPion (WELLBUTRIN XL) 150 MG 24 hr tablet Take 300 mg by mouth every morning AND Give 150 mg by mouth in the morning for depression       DULoxetine (CYMBALTA) 60 MG capsule Take 60 mg by mouth daily        gabapentin (NEURONTIN) 300 MG capsule Take 900 mg by mouth 3 times daily        guanFACINE (TENEX) 2 MG tablet Take 2 mg by mouth At Bedtime        hydrOXYzine (ATARAX) 25 MG tablet Take 1 tablet (25 mg) by mouth 2 times daily And q 6 hour prn  "with daily prn max of 2 doses 20 tablet 0     levofloxacin (LEVAQUIN) 500 MG tablet Take 1 tablet (500 mg) by mouth daily for 7 days 7 tablet 0     LORazepam (ATIVAN) 1 MG tablet Take 1 tablet (1 mg) by mouth 2 times daily as needed for anxiety 20 tablet 0     naproxen sodium (ANAPROX) 550 MG tablet Take 550 mg by mouth daily (with breakfast)        ondansetron (ZOFRAN-ODT) 4 MG ODT tab Take 1 tablet (4 mg) by mouth every 6 hours as needed for nausea or vomiting 10 tablet 0     oxyCODONE (ROXICODONE) 5 MG tablet Take 1-2 tablets (5-10 mg) by mouth every 4 hours as needed 1 tab po q 4 hrs prn pain scale \"1-5\"  2 tabs po q 4 hrs prn pain scale \"6-10\" 30 tablet 0     senna-docusate (SENOKOT-S/PERICOLACE) 8.6-50 MG tablet Take 1 tablet by mouth 2 times daily 20 tablet 0     traZODone (DESYREL) 50 MG tablet Take 100 mg by mouth At Bedtime            REVIEW OF SYSTEMS:  4 point ROS including Respiratory, CV, GI and , other than that noted in the HPI,  is negative    Objective:  /61   Pulse 85   Temp 97.5  F (36.4  C)   Resp 18   Ht 1.753 m (5' 9\")   Wt 79.2 kg (174 lb 9.6 oz)   SpO2 94%   BMI 25.78 kg/m     Exam:    Resp: Effort WNL, LS decreased in bases  CV: S1-2, no S3-4, no murmurs noted-trace to 1+ bilateral LE edema, wearing compression  Abd- soft, nontender, BS +  Musc- ANDERSON- moving easier today  Skin- no rash on buttocks or trunk or extremities noted  Psych- alert, calm, pleasant      Labs:   Recent labs in EPIC reviewed by me today.     ASSESSMENT/PLAN:  Rash  - no rash present, resolved on own. Nsg to continue to standard hygiene and skin cares and monitor. Update writer if reappears    HCAP (healthcare-associated pneumonia)  - new dx this week. Patient requiring O2 since surgery. Now on Levaquin course.  - VS per unit protocol  - pulmonary toilet/mobilize  - wean O2 off - keep sats > 90%  - follow clinically      Anxiety  - acute on chronic,- situational stressors in play, calm presently  - " continue home med regimen  - supportive approach  - consider ACP referral  - monitor mood and behaviors      Orders written by provider at facility        Electronically signed by:  KIMBERLEY Henderson CNP                 Sincerely,        KIMBERLEY Henderson CNP

## 2019-06-27 NOTE — PROGRESS NOTES
[facility]  [facility] Medical Record Number:  [id number]  Place of Service where encounter took place:  [facility]  (S) [[id number]]  Chief Complaint   Patient presents with     Derm Problem       HPI:    Greyson Rogers  is a 59 year old (1960), who is being seen today for an episodic care visit.  HPI information obtained from: facility chart records, facility staff, patient report and [facility] chart review. Today's concern is:  Rash  - nursing reported noting buttocks rash yesterday. Barrier cream applied, writer summoned to examine.   No rash is present today. Patient denies pain, itching to area.    HCAP (healthcare-associated pneumonia)  - new on Levaquin this week. Staff are weaning O2 off.   Patient denies cough, fever, chills. VSS    Anxiety  - nsg reported patient quite anxious and expressing desire to leave yesterday. Today patient is calm and is agreeing to stay for acute rehab.       Past Medical and Surgical History reviewed in Epic today.    MEDICATIONS:  Current Outpatient Medications   Medication Sig Dispense Refill     acetaminophen (TYLENOL) 500 MG tablet Take 500 mg by mouth 4 times daily And Q6H PRN       albuterol (PROVENTIL) (2.5 MG/3ML) 0.083% neb solution Take 1 vial (2.5 mg) by nebulization 2 times daily for 7 days 42 mL      aspirin (ASA) 325 MG EC tablet Take 1 tablet (325 mg) by mouth daily 30 tablet 0     buPROPion (WELLBUTRIN XL) 150 MG 24 hr tablet Take 300 mg by mouth every morning AND Give 150 mg by mouth in the morning for depression       DULoxetine (CYMBALTA) 60 MG capsule Take 60 mg by mouth daily        gabapentin (NEURONTIN) 300 MG capsule Take 900 mg by mouth 3 times daily        guanFACINE (TENEX) 2 MG tablet Take 2 mg by mouth At Bedtime        hydrOXYzine (ATARAX) 25 MG tablet Take 1 tablet (25 mg) by mouth 2 times daily And q 6 hour prn with daily prn max of 2 doses 20 tablet 0     levofloxacin (LEVAQUIN) 500 MG tablet Take 1 tablet  "(500 mg) by mouth daily for 7 days 7 tablet 0     LORazepam (ATIVAN) 1 MG tablet Take 1 tablet (1 mg) by mouth 2 times daily as needed for anxiety 20 tablet 0     naproxen sodium (ANAPROX) 550 MG tablet Take 550 mg by mouth daily (with breakfast)        ondansetron (ZOFRAN-ODT) 4 MG ODT tab Take 1 tablet (4 mg) by mouth every 6 hours as needed for nausea or vomiting 10 tablet 0     oxyCODONE (ROXICODONE) 5 MG tablet Take 1-2 tablets (5-10 mg) by mouth every 4 hours as needed 1 tab po q 4 hrs prn pain scale \"1-5\"  2 tabs po q 4 hrs prn pain scale \"6-10\" 30 tablet 0     senna-docusate (SENOKOT-S/PERICOLACE) 8.6-50 MG tablet Take 1 tablet by mouth 2 times daily 20 tablet 0     traZODone (DESYREL) 50 MG tablet Take 100 mg by mouth At Bedtime            REVIEW OF SYSTEMS:  4 point ROS including Respiratory, CV, GI and , other than that noted in the HPI,  is negative    Objective:  /61   Pulse 85   Temp 97.5  F (36.4  C)   Resp 18   Ht 1.753 m (5' 9\")   Wt 79.2 kg (174 lb 9.6 oz)   SpO2 94%   BMI 25.78 kg/m    Exam:    Resp: Effort WNL, LS decreased in bases  CV: S1-2, no S3-4, no murmurs noted-trace to 1+ bilateral LE edema, wearing compression  Abd- soft, nontender, BS +  Musc- ANDERSON- moving easier today  Skin- no rash on buttocks or trunk or extremities noted  Psych- alert, calm, pleasant      Labs:   Recent labs in EPIC reviewed by me today.     ASSESSMENT/PLAN:  Rash  - no rash present, resolved on own. Nsg to continue to standard hygiene and skin cares and monitor. Update writer if reappears    HCAP (healthcare-associated pneumonia)  - new dx this week. Patient requiring O2 since surgery. Now on Levaquin course.  - VS per unit protocol  - pulmonary toilet/mobilize  - wean O2 off - keep sats > 90%  - follow clinically      Anxiety  - acute on chronic,- situational stressors in play, calm presently  - continue home med regimen  - supportive approach  - consider ACP referral  - monitor mood and " behaviors      Orders written by provider at facility        Electronically signed by:  KIMBERLEY Henderson CNP

## 2019-07-01 ENCOUNTER — NURSING HOME VISIT (OUTPATIENT)
Dept: GERIATRICS | Facility: CLINIC | Age: 59
End: 2019-07-01
Payer: COMMERCIAL

## 2019-07-01 DIAGNOSIS — Z53.9 ERRONEOUS ENCOUNTER--DISREGARD: Primary | ICD-10-CM

## 2019-07-09 ENCOUNTER — NURSING HOME VISIT (OUTPATIENT)
Dept: GERIATRICS | Facility: CLINIC | Age: 59
End: 2019-07-09
Payer: COMMERCIAL

## 2019-07-09 VITALS
HEIGHT: 69 IN | HEART RATE: 85 BPM | WEIGHT: 158 LBS | SYSTOLIC BLOOD PRESSURE: 105 MMHG | BODY MASS INDEX: 23.4 KG/M2 | DIASTOLIC BLOOD PRESSURE: 67 MMHG | TEMPERATURE: 98.1 F | OXYGEN SATURATION: 96 % | RESPIRATION RATE: 16 BRPM
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
## 2019-07-09 DIAGNOSIS — D62 ANEMIA DUE TO BLOOD LOSS, ACUTE: ICD-10-CM

## 2019-07-09 DIAGNOSIS — F33.9 RECURRENT MAJOR DEPRESSIVE DISORDER, REMISSION STATUS UNSPECIFIED (H): ICD-10-CM

## 2019-07-09 DIAGNOSIS — J18.9 HAP (HOSPITAL-ACQUIRED PNEUMONIA): ICD-10-CM

## 2019-07-09 DIAGNOSIS — G89.29 CHRONIC RIGHT-SIDED LOW BACK PAIN WITHOUT SCIATICA: ICD-10-CM

## 2019-07-09 DIAGNOSIS — S72.001A CLOSED DISPLACED FRACTURE OF RIGHT FEMORAL NECK (H): Primary | ICD-10-CM

## 2019-07-09 DIAGNOSIS — M54.50 CHRONIC RIGHT-SIDED LOW BACK PAIN WITHOUT SCIATICA: ICD-10-CM

## 2019-07-09 DIAGNOSIS — R53.81 PHYSICAL DECONDITIONING: ICD-10-CM

## 2019-07-09 PROCEDURE — 99316 NF DSCHRG MGMT 30 MIN+: CPT | Performed by: NURSE PRACTITIONER

## 2019-07-09 ASSESSMENT — MIFFLIN-ST. JEOR: SCORE: 1351.06

## 2019-07-09 NOTE — LETTER
7/9/2019        RE: Greyson Rogers  [address]  [facility] [address]        [facility] DISCHARGE SUMMARY  PATIENT'S NAME: Greyson Rogers  YOB: 1960  MEDICAL RECORD NUMBER:  [id number]  Place of Service where encounter took place:  [facility]  ([facility]) [[id number]]    PRIMARY CARE PROVIDER AND CLINIC RESPONSIBLE AFTER TRANSFER:   Shakira Edwards NP, [place] [address] [facility]  / [facility]*    Non-[facility] Provider     Transferring providers: KIMBERLEY Henderson CNP, Melly Polo MD  Recent Hospitalization/ED:  [facility] Hospital stay 6/17/19 to 6/22/19.  Date of SNF Admission: June / 22 / 2019  Date of SNF (anticipated) Discharge: July / 12 / 2019  Discharged to: previous independent home  Cognitive Scores: SLUMS 22/30, CPT 4.8/5.6  Physical Function: Independent with ADLs, Ambulating 300 feet with rolling walker contact guard assist/supervision      CODE STATUS/ADVANCE DIRECTIVES DISCUSSION:  Full Code     ALLERGIES: Patient has no known allergies.    DISCHARGE DIAGNOSIS/NURSING FACILITY COURSE:   59 y.o female with PMH MDD, anxiety, HLD, and chronic low back pain admitted to hospital as above following mechanical fall and resultant right femoral neck fracture. Is s/p right EVAN 6/18.. Developed some acute blood loss anemia, stable and did not require transfusion. Required O2 thought 2/2 atelectasis. Pain difficult to manage, but opiates needed to be decreased 2/2 excessive sedation. Patient admitted to TCU for acute rehab.  Treated for PNA and weaned off O2. Has f/w ortho with good report and made good gains in rehab. Discharge functional status is as above.     Problems addressed in TCU:  Closed displaced fracture of right femoral neck (H)  Fall, subsequent  Encounter  S/p EVAN 6/18 Dr. Zaheer Zhou  - patient pain difficult to control initially but pain has lessened and come under much better control as rehab  course unfolded. F/w ortho 7/2 with good report. Staples out and incision healing without complication.  - continue on schedule acetaminophen and continue some prn dosing  -  discontinue hydroxyzine  - discontinue prn oxycodone- not requiring for pain control  - WBAT- PT/OT  - keep incision clean, dry and intact  - f/w ortho in 3-4 weeks from 7/2 visit        HAP (hospital-acquired pneumonia)  - had persistent post op hypoxia, patient unable to corretcly use IS despite instruction reiteration. CXR ordered and reviewed- LLL infiltrate  Completed 7 day Levofloxacin (LEVAQUIN) course and weaned off O2. Respiratory status has been stable.    -Resolving     Recurrent major depressive disorder, remission status unspecified (H)  Anxiety  - appears anxious- tremulous at times (states is chronic)  - continue longstanding pharmacotherapy with Cymbalta, Wellbutrin and Trazodone, and prn Lorazepam initially monitored closely 2/2  some concern for ? Serotonin syndrome or akathesia- and may benefit from dosage reduction- but this lessened as pain came under better control and patient adapted to rehab routine  - f/w PCP after discharge      Chronic right-sided low back pain without sciatica  - noted, stable-   - pain regimen as above     Anemia due to blood loss, acute  HGB 13.4 --> 8.9 postop- hemodynamically stable and HGB trending up  - observe for s/s bleeding  - recheck HGB periodically     Physical deconditioning  2/2 above  - lives with adult children- returning there  - home with home care as outlined         Past Medical History:  has a past medical history of Depressive disorder.    Discharge Medications:  Current Outpatient Medications   Medication Sig Dispense Refill     acetaminophen (TYLENOL) 500 MG tablet Take 500 mg by mouth 4 times daily And Q6H PRN       aspirin (ASA) 325 MG EC tablet Take 1 tablet (325 mg) by mouth daily 30 tablet 0     buPROPion (WELLBUTRIN XL) 150 MG 24 hr tablet Take 300 mg by mouth every  "morning AND Give 150 mg by mouth in the morning for depression       DULoxetine (CYMBALTA) 60 MG capsule Take 60 mg by mouth daily        gabapentin (NEURONTIN) 300 MG capsule Take 900 mg by mouth 3 times daily        hydrOXYzine (ATARAX) 25 MG tablet Take 1 tablet (25 mg) by mouth 2 times daily And q 6 hour prn with daily prn max of 2 doses 20 tablet 0     LORazepam (ATIVAN) 1 MG tablet Take 1 tablet (1 mg) by mouth 2 times daily as needed for anxiety 20 tablet 0     naproxen sodium (ANAPROX) 550 MG tablet Take 550 mg by mouth daily (with breakfast)        ondansetron (ZOFRAN-ODT) 4 MG ODT tab Take 1 tablet (4 mg) by mouth every 6 hours as needed for nausea or vomiting 10 tablet 0     oxyCODONE (ROXICODONE) 5 MG tablet Take 1-2 tablets (5-10 mg) by mouth every 4 hours as needed 1 tab po q 4 hrs prn pain scale \"1-5\"  2 tabs po q 4 hrs prn pain scale \"6-10\" 30 tablet 0     senna-docusate (SENOKOT-S/PERICOLACE) 8.6-50 MG tablet Take 1 tablet by mouth 2 times daily 20 tablet 0     traZODone (DESYREL) 50 MG tablet Take 100 mg by mouth At Bedtime        albuterol (PROVENTIL) (2.5 MG/3ML) 0.083% neb solution Take 1 vial (2.5 mg) by nebulization 2 times daily for 7 days 42 mL      guanFACINE (TENEX) 2 MG tablet Take 2 mg by mouth At Bedtime          Medication Changes/Rationale:     As above    Controlled medications sent with patient:   not applicable/none  Medication: lorazepam 0.5 , 7 tabs given to patient at the time of discharge to take home     ROS:   4 point ROS including Respiratory, CV, GI and , other than that noted in the HPI,  is negative    Physical Exam:   Vitals: /67   Pulse 85   Temp 98.1  F (36.7  C)   Resp 16   Ht 1.753 m (5' 9\")   Wt 71.7 kg (158 lb)   SpO2 96%   BMI 23.33 kg/m     BMI= Body mass index is 23.33 kg/m .  Resp: Effort WNL, LSCTA  CV: VSS- trace bilateral LE edema  Abd- soft, nontender, BS +  Musc- ANDERSON  Skin- right hip incision well approximated without erythema or warmth " noted. No drainage  Psych- alert, calm, pleasant      SNF labs: Recent labs in EPIC reviewed by me today.       DISCHARGE PLAN:    Follow up labs: No labs orders/due    Medical Follow Up:      Follow up with primary care provider in 1 week  Follow up with specialist as above     MTM referral needed and placed by this provider: No    Current [place] scheduled appointments:       Discharge Services: Home Care:  Occupational Therapy, Physical Therapy, Registered Nurse and     Discharge Instructions Verbalized to Patient at Discharge:     As above      TOTAL DISCHARGE TIME:   Greater than 30 minutes  Electronically signed by:  KIMBERLEY Henderson CNP     Home care Face to Face documentation done in [facility] attached to Home care orders for [facility].                       Sincerely,        KIMBERLEY Henderson CNP

## 2019-07-09 NOTE — PROGRESS NOTES
[facility] DISCHARGE SUMMARY  PATIENT'S NAME: Greyson Rogers  YOB: 1960  MEDICAL RECORD NUMBER:  [id number]  Place of Service where encounter took place:  [facility]  ([facility] [[id number]]    PRIMARY CARE PROVIDER AND CLINIC RESPONSIBLE AFTER TRANSFER:   Shakira Edwards NP, [facility] [place] [address] [address]   / [place]*    Non-[facility] Provider     Transferring providers: KIMBERLEY Henderson CNP, Melly Polo MD  Recent Hospitalization/ED:  [facility] stay 6/17/19 to 6/22/19.  Date of SNF Admission: June / 22 / 2019  Date of SNF (anticipated) Discharge: July / 12 / 2019  Discharged to: previous independent home  Cognitive Scores: SLUMS 22/30, CPT 4.8/5.6  Physical Function: Independent with ADLs, Ambulating 300 feet with rolling walker contact guard assist/supervision      CODE STATUS/ADVANCE DIRECTIVES DISCUSSION:  Full Code     ALLERGIES: Patient has no known allergies.    DISCHARGE DIAGNOSIS/NURSING FACILITY COURSE:   59 y.o female with PMH MDD, anxiety, HLD, and chronic low back pain admitted to hospital as above following mechanical fall and resultant right femoral neck fracture. Is s/p right EVAN 6/18.. Developed some acute blood loss anemia, stable and did not require transfusion. Required O2 thought 2/2 atelectasis. Pain difficult to manage, but opiates needed to be decreased 2/2 excessive sedation. Patient admitted to TCU for acute rehab. Treated for PNA and weaned off O2. Has f/w ortho with good report and made good gains in rehab. Discharge functional status is as above.     Problems addressed in TCU:  Closed displaced fracture of right femoral neck (H)  Fall, subsequent  Encounter  S/p EVAN 6/18 Dr. Zaheer Zhou  - patient pain difficult to control initially but pain has lessened and come under much better control as rehab course unfolded. F/w ortho 7/2 with good report. Staples out and incision healing without  complication.  - continue on schedule acetaminophen and continue some prn dosing  - discontinue hydroxyzine  - discontinue prn oxycodone- not requiring for pain control  - WBAT- PT/OT  - keep incision clean, dry and intact  - f/w ortho in 3-4 weeks from 7/2 visit        HAP (hospital-acquired pneumonia)  - had persistent post op hypoxia, patient unable to corretcly use IS despite instruction reiteration. CXR ordered and reviewed- LLL infiltrate  Completed 7 day Levofloxacin (LEVAQUIN) course and weaned off O2. Respiratory status has been stable.    -Resolving     Recurrent major depressive disorder, remission status unspecified (H)  Anxiety  - appears anxious- tremulous at times (states is chronic)  - continue longstanding pharmacotherapy with Cymbalta, Wellbutrin and Trazodone, and prn Lorazepam initially monitored closely 2/2  some concern for ? Serotonin syndrome or akathesia- and may benefit from dosage reduction- but this lessened as pain came under better control and patient adapted to rehab routine  - f/w PCP after discharge      Chronic right-sided low back pain without sciatica  - noted, stable-   - pain regimen as above     Anemia due to blood loss, acute  HGB 13.4 --> 8.9 postop- hemodynamically stable and HGB trending up  - observe for s/s bleeding  - recheck HGB periodically     Physical deconditioning  2/2 above  - lives with adult children- returning there  - home with home care as outlined         Past Medical History:  has a past medical history of Depressive disorder.    Discharge Medications:  Current Outpatient Medications   Medication Sig Dispense Refill     acetaminophen (TYLENOL) 500 MG tablet Take 500 mg by mouth 4 times daily And Q6H PRN       aspirin (ASA) 325 MG EC tablet Take 1 tablet (325 mg) by mouth daily 30 tablet 0     buPROPion (WELLBUTRIN XL) 150 MG 24 hr tablet Take 300 mg by mouth every morning AND Give 150 mg by mouth in the morning for depression       DULoxetine (CYMBALTA) 60  "MG capsule Take 60 mg by mouth daily        gabapentin (NEURONTIN) 300 MG capsule Take 900 mg by mouth 3 times daily        hydrOXYzine (ATARAX) 25 MG tablet Take 1 tablet (25 mg) by mouth 2 times daily And q 6 hour prn with daily prn max of 2 doses 20 tablet 0     LORazepam (ATIVAN) 1 MG tablet Take 1 tablet (1 mg) by mouth 2 times daily as needed for anxiety 20 tablet 0     naproxen sodium (ANAPROX) 550 MG tablet Take 550 mg by mouth daily (with breakfast)        ondansetron (ZOFRAN-ODT) 4 MG ODT tab Take 1 tablet (4 mg) by mouth every 6 hours as needed for nausea or vomiting 10 tablet 0     oxyCODONE (ROXICODONE) 5 MG tablet Take 1-2 tablets (5-10 mg) by mouth every 4 hours as needed 1 tab po q 4 hrs prn pain scale \"1-5\"  2 tabs po q 4 hrs prn pain scale \"6-10\" 30 tablet 0     senna-docusate (SENOKOT-S/PERICOLACE) 8.6-50 MG tablet Take 1 tablet by mouth 2 times daily 20 tablet 0     traZODone (DESYREL) 50 MG tablet Take 100 mg by mouth At Bedtime        albuterol (PROVENTIL) (2.5 MG/3ML) 0.083% neb solution Take 1 vial (2.5 mg) by nebulization 2 times daily for 7 days 42 mL      guanFACINE (TENEX) 2 MG tablet Take 2 mg by mouth At Bedtime          Medication Changes/Rationale:     As above    Controlled medications sent with patient:   not applicable/none  Medication: lorazepam 0.5 , 7 tabs given to patient at the time of discharge to take home     ROS:   4 point ROS including Respiratory, CV, GI and , other than that noted in the HPI,  is negative    Physical Exam:   Vitals: /67   Pulse 85   Temp 98.1  F (36.7  C)   Resp 16   Ht 1.753 m (5' 9\")   Wt 71.7 kg (158 lb)   SpO2 96%   BMI 23.33 kg/m    BMI= Body mass index is 23.33 kg/m .  Resp: Effort WNL, LSCTA  CV: VSS- trace bilateral LE edema  Abd- soft, nontender, BS +  Musc- ANDERSON  Skin- right hip incision well approximated without erythema or warmth noted. No drainage  Psych- alert, calm, pleasant      SNF labs: Recent labs in [facility] reviewed by me " today.       DISCHARGE PLAN:    Follow up labs: No labs orders/due    Medical Follow Up:      Follow up with primary care provider in 1 week  Follow up with specialist as above     MTM referral needed and placed by this provider: No    Current [facility] scheduled appointments:       Discharge Services: Home Care:  Occupational Therapy, Physical Therapy, Registered Nurse and     Discharge Instructions Verbalized to Patient at Discharge:     As above      TOTAL DISCHARGE TIME:   Greater than 30 minutes  Electronically signed by:  IKMBERLEY Henderson CNP     Home care Face to Face documentation done in [facility] attached to Home care orders for [facility].

## 2019-08-27 RX ORDER — BUPROPION HYDROCHLORIDE 300 MG/1
TABLET ORAL
Qty: 30 TABLET | Refills: 0 | OUTPATIENT
Start: 2019-08-27

## 2019-08-27 RX ORDER — BUPROPION HYDROCHLORIDE 150 MG/1
TABLET ORAL
Qty: 30 TABLET | Refills: 0 | OUTPATIENT
Start: 2019-08-27

## 2019-08-27 RX ORDER — NAPROXEN SODIUM 550 MG/1
TABLET ORAL
Qty: 30 TABLET | Refills: 0 | OUTPATIENT
Start: 2019-08-27

## 2019-08-27 NOTE — TELEPHONE ENCOUNTER
This patient is no longer under our care. [facility] provider only covered the patient when they were at a skilled nursing or assisted living facility we provide care at. Please direct this refill request to the patient's current provider. Per [facility] records, that may be Shakira Edwards

## 2020-04-17 RX ORDER — BUPROPION HYDROCHLORIDE 300 MG/1
TABLET ORAL
Qty: 30 TABLET | Refills: 0 | OUTPATIENT
Start: 2020-04-17

## 2020-04-17 RX ORDER — BUPROPION HYDROCHLORIDE 150 MG/1
TABLET ORAL
Qty: 30 TABLET | Refills: 0 | OUTPATIENT
Start: 2020-04-17

## 2020-04-17 RX ORDER — NAPROXEN SODIUM 550 MG/1
TABLET ORAL
Qty: 30 TABLET | Refills: 0 | OUTPATIENT
Start: 2020-04-17

## (undated) DEVICE — GLOVE PROTEXIS POWDER FREE 8.5 ORTHOPEDIC 2D73ET85

## (undated) DEVICE — DRAPE STERI U 1015

## (undated) DEVICE — DEVICE RETRIEVER HEWSON 71111579

## (undated) DEVICE — BAG CLEAR TRASH 1.3M 39X33" P4040C

## (undated) DEVICE — DRAPE CONVERTORS U-DRAPE 60X72" 8476

## (undated) DEVICE — SU VICRYL 2-0 CT-1 36" UND J945H

## (undated) DEVICE — IMM PILLOW ABDUCT HIP MED M60-025-M

## (undated) DEVICE — PACK TOTAL HIP RIDGES LATEX PO15HIFSG

## (undated) DEVICE — SU VICRYL 1 CT-1 27" J341H

## (undated) DEVICE — SOL NACL 0.9% IRRIG 1000ML BOTTLE 2F7124

## (undated) DEVICE — SOL NACL 0.9% INJ 1000ML BAG 2B1324X

## (undated) DEVICE — LINEN HALF SHEET 5512

## (undated) DEVICE — GLOVE PROTEXIS BLUE W/NEU-THERA 6.5  2D73EB65

## (undated) DEVICE — SU ETHIBOND 5 LRDA 30" B499T

## (undated) DEVICE — BLADE SAW SAGITTAL STRK 25X90X1.27MM HD SYS 6 6125-127-090

## (undated) DEVICE — GLOVE PROTEXIS POWDER FREE 8.0 ORTHOPEDIC 2D73ET80

## (undated) DEVICE — DRSG GAUZE 4X4" TRAY

## (undated) DEVICE — PREP CHLORAPREP 26ML TINTED ORANGE  260815

## (undated) DEVICE — SUCTION MANIFOLD NEPTUNE 2 SYS 4 PORT 0702-020-000

## (undated) DEVICE — GLOVE PROTEXIS BLUE W/NEU-THERA 8.5  2D73EB85

## (undated) DEVICE — SET HANDPIECE INTERPULSE W/COAXIAL FAN SPRAY TIP 0210118000

## (undated) DEVICE — SOL WATER IRRIG 1000ML BOTTLE 2F7114

## (undated) DEVICE — SU VICRYL 2-0 CT-1 27" UND J259H

## (undated) DEVICE — GLOVE PROTEXIS POWDER FREE 7.5 ORTHOPEDIC 2D73ET75

## (undated) DEVICE — LINEN DRAPE 54X72" 5467

## (undated) DEVICE — GLOVE PROTEXIS POWDER FREE 6.5 ORTHOPEDIC 2D73ET65

## (undated) DEVICE — GLOVE PROTEXIS W/NEU-THERA 8.5  2D73TE85

## (undated) DEVICE — DRSG GAUZE 4X4" 8044

## (undated) DEVICE — LINEN FULL SHEET 5511

## (undated) DEVICE — ESU ELEC BLADE 6" COATED E1450-6

## (undated) DEVICE — DRSG AQUACEL AG 3.5X9.75" HYDROFIBER 412011

## (undated) DEVICE — DRAIN HEMOVAC RESERVOIR KIT 10FR 1/8" MED 00-2550-002-10

## (undated) DEVICE — BLADE KNIFE SURG 15 371115

## (undated) DEVICE — LINEN ORTHO ACL PACK 5447

## (undated) RX ORDER — DEXAMETHASONE SODIUM PHOSPHATE 4 MG/ML
INJECTION, SOLUTION INTRA-ARTICULAR; INTRALESIONAL; INTRAMUSCULAR; INTRAVENOUS; SOFT TISSUE
Status: DISPENSED
Start: 2019-06-18

## (undated) RX ORDER — GLYCOPYRROLATE 0.2 MG/ML
INJECTION INTRAMUSCULAR; INTRAVENOUS
Status: DISPENSED
Start: 2019-06-18

## (undated) RX ORDER — LIDOCAINE HYDROCHLORIDE 10 MG/ML
INJECTION, SOLUTION EPIDURAL; INFILTRATION; INTRACAUDAL; PERINEURAL
Status: DISPENSED
Start: 2019-06-18

## (undated) RX ORDER — ONDANSETRON 2 MG/ML
INJECTION INTRAMUSCULAR; INTRAVENOUS
Status: DISPENSED
Start: 2019-06-18

## (undated) RX ORDER — FENTANYL CITRATE 50 UG/ML
INJECTION, SOLUTION INTRAMUSCULAR; INTRAVENOUS
Status: DISPENSED
Start: 2019-06-18

## (undated) RX ORDER — CEFAZOLIN SODIUM 2 G/100ML
INJECTION, SOLUTION INTRAVENOUS
Status: DISPENSED
Start: 2019-06-18

## (undated) RX ORDER — BUPIVACAINE HYDROCHLORIDE AND EPINEPHRINE 2.5; 5 MG/ML; UG/ML
INJECTION, SOLUTION EPIDURAL; INFILTRATION; INTRACAUDAL; PERINEURAL
Status: DISPENSED
Start: 2019-06-18

## (undated) RX ORDER — EPHEDRINE SULFATE 50 MG/ML
INJECTION, SOLUTION INTRAMUSCULAR; INTRAVENOUS; SUBCUTANEOUS
Status: DISPENSED
Start: 2019-06-18

## (undated) RX ORDER — PROPOFOL 10 MG/ML
INJECTION, EMULSION INTRAVENOUS
Status: DISPENSED
Start: 2019-06-18

## (undated) RX ORDER — CEFAZOLIN SODIUM 1 G/3ML
INJECTION, POWDER, FOR SOLUTION INTRAMUSCULAR; INTRAVENOUS
Status: DISPENSED
Start: 2019-06-18

## (undated) RX ORDER — NEOSTIGMINE METHYLSULFATE 1 MG/ML
VIAL (ML) INJECTION
Status: DISPENSED
Start: 2019-06-18

## (undated) RX ORDER — PHENYLEPHRINE HCL IN 0.9% NACL 1 MG/10 ML
SYRINGE (ML) INTRAVENOUS
Status: DISPENSED
Start: 2019-06-18